# Patient Record
Sex: FEMALE | Race: BLACK OR AFRICAN AMERICAN | NOT HISPANIC OR LATINO | Employment: STUDENT | URBAN - METROPOLITAN AREA
[De-identification: names, ages, dates, MRNs, and addresses within clinical notes are randomized per-mention and may not be internally consistent; named-entity substitution may affect disease eponyms.]

---

## 2024-02-09 ENCOUNTER — APPOINTMENT (OUTPATIENT)
Dept: RADIOLOGY | Facility: HOSPITAL | Age: 54
End: 2024-02-09
Payer: COMMERCIAL

## 2024-02-09 ENCOUNTER — APPOINTMENT (OUTPATIENT)
Dept: CARDIOLOGY | Facility: HOSPITAL | Age: 54
End: 2024-02-09
Payer: COMMERCIAL

## 2024-02-09 ENCOUNTER — HOSPITAL ENCOUNTER (INPATIENT)
Facility: HOSPITAL | Age: 54
LOS: 4 days | Discharge: HOME | End: 2024-02-13
Attending: EMERGENCY MEDICINE | Admitting: INTERNAL MEDICINE
Payer: COMMERCIAL

## 2024-02-09 DIAGNOSIS — R07.9 CHEST PAIN, UNSPECIFIED TYPE: ICD-10-CM

## 2024-02-09 DIAGNOSIS — L03.115 CELLULITIS OF RIGHT LOWER LEG: Primary | ICD-10-CM

## 2024-02-09 DIAGNOSIS — I82.4Y1 ACUTE DEEP VEIN THROMBOSIS (DVT) OF PROXIMAL VEIN OF RIGHT LOWER EXTREMITY (MULTI): ICD-10-CM

## 2024-02-09 LAB
ALBUMIN SERPL-MCNC: 3.9 G/DL (ref 3.5–5)
ALP BLD-CCNC: 69 U/L (ref 35–125)
ALT SERPL-CCNC: 23 U/L (ref 5–40)
ANION GAP SERPL CALC-SCNC: 7 MMOL/L
APPEARANCE UR: CLEAR
AST SERPL-CCNC: 17 U/L (ref 5–40)
BASOPHILS # BLD AUTO: 0.01 X10*3/UL (ref 0–0.1)
BASOPHILS NFR BLD AUTO: 0.2 %
BILIRUB SERPL-MCNC: 0.3 MG/DL (ref 0.1–1.2)
BILIRUB UR STRIP.AUTO-MCNC: NEGATIVE MG/DL
BUN SERPL-MCNC: 11 MG/DL (ref 8–25)
CALCIUM SERPL-MCNC: 9.2 MG/DL (ref 8.5–10.4)
CHLORIDE SERPL-SCNC: 104 MMOL/L (ref 97–107)
CO2 SERPL-SCNC: 28 MMOL/L (ref 24–31)
COLOR UR: NORMAL
CREAT SERPL-MCNC: 1 MG/DL (ref 0.4–1.6)
CRP SERPL-MCNC: 1.2 MG/DL (ref 0–2)
EGFRCR SERPLBLD CKD-EPI 2021: 67 ML/MIN/1.73M*2
EOSINOPHIL # BLD AUTO: 0.11 X10*3/UL (ref 0–0.7)
EOSINOPHIL NFR BLD AUTO: 2.1 %
ERYTHROCYTE [DISTWIDTH] IN BLOOD BY AUTOMATED COUNT: 13.3 % (ref 11.5–14.5)
ERYTHROCYTE [SEDIMENTATION RATE] IN BLOOD BY WESTERGREN METHOD: 15 MM/H (ref 0–30)
GLUCOSE SERPL-MCNC: 108 MG/DL (ref 65–99)
GLUCOSE UR STRIP.AUTO-MCNC: NORMAL MG/DL
HCG UR QL IA.RAPID: NEGATIVE
HCT VFR BLD AUTO: 41.2 % (ref 36–46)
HGB BLD-MCNC: 13.3 G/DL (ref 12–16)
IMM GRANULOCYTES # BLD AUTO: 0.01 X10*3/UL (ref 0–0.7)
IMM GRANULOCYTES NFR BLD AUTO: 0.2 % (ref 0–0.9)
KETONES UR STRIP.AUTO-MCNC: NEGATIVE MG/DL
LACTATE BLDV-SCNC: 0.6 MMOL/L (ref 0.4–2)
LEUKOCYTE ESTERASE UR QL STRIP.AUTO: NEGATIVE
LYMPHOCYTES # BLD AUTO: 1.71 X10*3/UL (ref 1.2–4.8)
LYMPHOCYTES NFR BLD AUTO: 33.3 %
MCH RBC QN AUTO: 28.5 PG (ref 26–34)
MCHC RBC AUTO-ENTMCNC: 32.3 G/DL (ref 32–36)
MCV RBC AUTO: 88 FL (ref 80–100)
MONOCYTES # BLD AUTO: 0.4 X10*3/UL (ref 0.1–1)
MONOCYTES NFR BLD AUTO: 7.8 %
NEUTROPHILS # BLD AUTO: 2.89 X10*3/UL (ref 1.2–7.7)
NEUTROPHILS NFR BLD AUTO: 56.4 %
NITRITE UR QL STRIP.AUTO: NEGATIVE
NRBC BLD-RTO: 0 /100 WBCS (ref 0–0)
PH UR STRIP.AUTO: 5.5 [PH]
PLATELET # BLD AUTO: 205 X10*3/UL (ref 150–450)
POTASSIUM SERPL-SCNC: 3.9 MMOL/L (ref 3.4–5.1)
PROT SERPL-MCNC: 7.1 G/DL (ref 5.9–7.9)
PROT UR STRIP.AUTO-MCNC: NEGATIVE MG/DL
RBC # BLD AUTO: 4.67 X10*6/UL (ref 4–5.2)
RBC # UR STRIP.AUTO: NEGATIVE /UL
SARS-COV-2 RNA RESP QL NAA+PROBE: NOT DETECTED
SODIUM SERPL-SCNC: 139 MMOL/L (ref 133–145)
SP GR UR STRIP.AUTO: 1.01
TROPONIN T SERPL-MCNC: <6 NG/L
UROBILINOGEN UR STRIP.AUTO-MCNC: NORMAL MG/DL
WBC # BLD AUTO: 5.1 X10*3/UL (ref 4.4–11.3)

## 2024-02-09 PROCEDURE — 96374 THER/PROPH/DIAG INJ IV PUSH: CPT

## 2024-02-09 PROCEDURE — 93971 EXTREMITY STUDY: CPT | Mod: FOREIGN READ | Performed by: RADIOLOGY

## 2024-02-09 PROCEDURE — 93005 ELECTROCARDIOGRAM TRACING: CPT

## 2024-02-09 PROCEDURE — 1210000001 HC SEMI-PRIVATE ROOM DAILY

## 2024-02-09 PROCEDURE — 81025 URINE PREGNANCY TEST: CPT | Performed by: EMERGENCY MEDICINE

## 2024-02-09 PROCEDURE — 85652 RBC SED RATE AUTOMATED: CPT | Performed by: EMERGENCY MEDICINE

## 2024-02-09 PROCEDURE — 81003 URINALYSIS AUTO W/O SCOPE: CPT | Performed by: EMERGENCY MEDICINE

## 2024-02-09 PROCEDURE — 71045 X-RAY EXAM CHEST 1 VIEW: CPT

## 2024-02-09 PROCEDURE — 96375 TX/PRO/DX INJ NEW DRUG ADDON: CPT

## 2024-02-09 PROCEDURE — 87635 SARS-COV-2 COVID-19 AMP PRB: CPT | Performed by: EMERGENCY MEDICINE

## 2024-02-09 PROCEDURE — 2500000001 HC RX 250 WO HCPCS SELF ADMINISTERED DRUGS (ALT 637 FOR MEDICARE OP): Performed by: EMERGENCY MEDICINE

## 2024-02-09 PROCEDURE — 83605 ASSAY OF LACTIC ACID: CPT | Performed by: EMERGENCY MEDICINE

## 2024-02-09 PROCEDURE — 36415 COLL VENOUS BLD VENIPUNCTURE: CPT | Performed by: EMERGENCY MEDICINE

## 2024-02-09 PROCEDURE — 80053 COMPREHEN METABOLIC PANEL: CPT | Performed by: EMERGENCY MEDICINE

## 2024-02-09 PROCEDURE — 2500000004 HC RX 250 GENERAL PHARMACY W/ HCPCS (ALT 636 FOR OP/ED): Performed by: EMERGENCY MEDICINE

## 2024-02-09 PROCEDURE — 84484 ASSAY OF TROPONIN QUANT: CPT | Performed by: EMERGENCY MEDICINE

## 2024-02-09 PROCEDURE — 99285 EMERGENCY DEPT VISIT HI MDM: CPT | Mod: 25 | Performed by: EMERGENCY MEDICINE

## 2024-02-09 PROCEDURE — 87040 BLOOD CULTURE FOR BACTERIA: CPT | Mod: WESLAB | Performed by: EMERGENCY MEDICINE

## 2024-02-09 PROCEDURE — 85025 COMPLETE CBC W/AUTO DIFF WBC: CPT | Performed by: EMERGENCY MEDICINE

## 2024-02-09 PROCEDURE — 73590 X-RAY EXAM OF LOWER LEG: CPT | Mod: RT

## 2024-02-09 PROCEDURE — 87086 URINE CULTURE/COLONY COUNT: CPT | Mod: WESLAB | Performed by: EMERGENCY MEDICINE

## 2024-02-09 PROCEDURE — 86140 C-REACTIVE PROTEIN: CPT | Performed by: EMERGENCY MEDICINE

## 2024-02-09 PROCEDURE — 93971 EXTREMITY STUDY: CPT

## 2024-02-09 RX ORDER — MORPHINE SULFATE 4 MG/ML
4 INJECTION, SOLUTION INTRAMUSCULAR; INTRAVENOUS ONCE
Status: DISCONTINUED | OUTPATIENT
Start: 2024-02-09 | End: 2024-02-13 | Stop reason: HOSPADM

## 2024-02-09 RX ORDER — VANCOMYCIN 1 G/200ML
1000 INJECTION, SOLUTION INTRAVENOUS ONCE
Status: COMPLETED | OUTPATIENT
Start: 2024-02-09 | End: 2024-02-09

## 2024-02-09 RX ORDER — ACETAMINOPHEN 325 MG/1
650 TABLET ORAL ONCE
Status: COMPLETED | OUTPATIENT
Start: 2024-02-09 | End: 2024-02-09

## 2024-02-09 RX ORDER — FAMOTIDINE 10 MG/ML
20 INJECTION INTRAVENOUS ONCE
Status: COMPLETED | OUTPATIENT
Start: 2024-02-09 | End: 2024-02-09

## 2024-02-09 RX ORDER — ONDANSETRON HYDROCHLORIDE 2 MG/ML
4 INJECTION, SOLUTION INTRAVENOUS ONCE
Status: DISCONTINUED | OUTPATIENT
Start: 2024-02-09 | End: 2024-02-13 | Stop reason: HOSPADM

## 2024-02-09 RX ADMIN — FAMOTIDINE 20 MG: 10 INJECTION INTRAVENOUS at 20:21

## 2024-02-09 RX ADMIN — ACETAMINOPHEN 650 MG: 325 TABLET ORAL at 20:20

## 2024-02-09 RX ADMIN — SODIUM CHLORIDE 1000 ML: 900 INJECTION, SOLUTION INTRAVENOUS at 20:22

## 2024-02-09 RX ADMIN — VANCOMYCIN 1000 MG: 1 INJECTION, SOLUTION INTRAVENOUS at 20:21

## 2024-02-09 RX ADMIN — APIXABAN 10 MG: 5 TABLET, FILM COATED ORAL at 20:20

## 2024-02-09 RX ADMIN — PIPERACILLIN SODIUM AND TAZOBACTAM SODIUM 3.38 G: 3; .375 INJECTION, SOLUTION INTRAVENOUS at 20:21

## 2024-02-09 ASSESSMENT — LIFESTYLE VARIABLES
EVER FELT BAD OR GUILTY ABOUT YOUR DRINKING: NO
EVER HAD A DRINK FIRST THING IN THE MORNING TO STEADY YOUR NERVES TO GET RID OF A HANGOVER: NO
HAVE PEOPLE ANNOYED YOU BY CRITICIZING YOUR DRINKING: NO
HAVE YOU EVER FELT YOU SHOULD CUT DOWN ON YOUR DRINKING: NO

## 2024-02-09 ASSESSMENT — COLUMBIA-SUICIDE SEVERITY RATING SCALE - C-SSRS
1. IN THE PAST MONTH, HAVE YOU WISHED YOU WERE DEAD OR WISHED YOU COULD GO TO SLEEP AND NOT WAKE UP?: NO
2. HAVE YOU ACTUALLY HAD ANY THOUGHTS OF KILLING YOURSELF?: NO
6. HAVE YOU EVER DONE ANYTHING, STARTED TO DO ANYTHING, OR PREPARED TO DO ANYTHING TO END YOUR LIFE?: NO

## 2024-02-09 ASSESSMENT — PAIN - FUNCTIONAL ASSESSMENT: PAIN_FUNCTIONAL_ASSESSMENT: 0-10

## 2024-02-09 ASSESSMENT — PAIN DESCRIPTION - ORIENTATION: ORIENTATION: RIGHT

## 2024-02-09 ASSESSMENT — PAIN DESCRIPTION - LOCATION: LOCATION: LEG

## 2024-02-09 ASSESSMENT — PAIN DESCRIPTION - DESCRIPTORS: DESCRIPTORS: PRESSURE;BURNING

## 2024-02-09 ASSESSMENT — PAIN SCALES - GENERAL: PAINLEVEL_OUTOF10: 8

## 2024-02-10 LAB
HOLD SPECIMEN: NORMAL
TROPONIN T SERPL-MCNC: <6 NG/L

## 2024-02-10 PROCEDURE — 2500000001 HC RX 250 WO HCPCS SELF ADMINISTERED DRUGS (ALT 637 FOR MEDICARE OP): Performed by: INTERNAL MEDICINE

## 2024-02-10 PROCEDURE — 1200000002 HC GENERAL ROOM WITH TELEMETRY DAILY

## 2024-02-10 PROCEDURE — 99223 1ST HOSP IP/OBS HIGH 75: CPT | Performed by: NURSE PRACTITIONER

## 2024-02-10 PROCEDURE — 2500000004 HC RX 250 GENERAL PHARMACY W/ HCPCS (ALT 636 FOR OP/ED): Performed by: INTERNAL MEDICINE

## 2024-02-10 PROCEDURE — 36415 COLL VENOUS BLD VENIPUNCTURE: CPT | Performed by: INTERNAL MEDICINE

## 2024-02-10 PROCEDURE — 84484 ASSAY OF TROPONIN QUANT: CPT | Performed by: INTERNAL MEDICINE

## 2024-02-10 PROCEDURE — 84484 ASSAY OF TROPONIN QUANT: CPT | Performed by: EMERGENCY MEDICINE

## 2024-02-10 PROCEDURE — 36415 COLL VENOUS BLD VENIPUNCTURE: CPT | Performed by: EMERGENCY MEDICINE

## 2024-02-10 PROCEDURE — 2500000002 HC RX 250 W HCPCS SELF ADMINISTERED DRUGS (ALT 637 FOR MEDICARE OP, ALT 636 FOR OP/ED): Performed by: HOSPITALIST

## 2024-02-10 RX ORDER — NICOTINE POLACRILEX 2 MG
3 GUM BUCCAL DAILY
COMMUNITY

## 2024-02-10 RX ORDER — FUROSEMIDE 20 MG/1
20 TABLET ORAL DAILY
Status: DISCONTINUED | OUTPATIENT
Start: 2024-02-10 | End: 2024-02-13 | Stop reason: HOSPADM

## 2024-02-10 RX ORDER — DUTASTERIDE 0.5 MG/1
0.5 CAPSULE, LIQUID FILLED ORAL DAILY
COMMUNITY
End: 2024-02-13 | Stop reason: HOSPADM

## 2024-02-10 RX ORDER — METOPROLOL TARTRATE 25 MG/1
12.5 TABLET, FILM COATED ORAL 2 TIMES DAILY
Status: DISCONTINUED | OUTPATIENT
Start: 2024-02-10 | End: 2024-02-12

## 2024-02-10 RX ORDER — ACETAMINOPHEN 160 MG/5ML
650 SOLUTION ORAL EVERY 4 HOURS PRN
Status: DISCONTINUED | OUTPATIENT
Start: 2024-02-10 | End: 2024-02-13 | Stop reason: HOSPADM

## 2024-02-10 RX ORDER — AMMONIUM LACTATE 12 G/100G
1 LOTION TOPICAL DAILY PRN
Status: DISCONTINUED | OUTPATIENT
Start: 2024-02-10 | End: 2024-02-13 | Stop reason: HOSPADM

## 2024-02-10 RX ORDER — BETAMETHASONE VALERATE 1.2 MG/G
1 OINTMENT TOPICAL 2 TIMES DAILY
COMMUNITY
End: 2024-02-13 | Stop reason: HOSPADM

## 2024-02-10 RX ORDER — CLOBETASOL PROPIONATE 0.5 MG/G
1 AEROSOL, FOAM TOPICAL DAILY
COMMUNITY
End: 2024-02-13 | Stop reason: HOSPADM

## 2024-02-10 RX ORDER — BISMUTH SUBSALICYLATE 262 MG
1 TABLET,CHEWABLE ORAL DAILY
Status: DISCONTINUED | OUTPATIENT
Start: 2024-02-10 | End: 2024-02-13 | Stop reason: HOSPADM

## 2024-02-10 RX ORDER — CHOLECALCIFEROL (VITAMIN D3) 50 MCG
2000 TABLET ORAL DAILY
Status: DISCONTINUED | OUTPATIENT
Start: 2024-02-10 | End: 2024-02-13 | Stop reason: HOSPADM

## 2024-02-10 RX ORDER — ACETAMINOPHEN 500 MG
1000 TABLET ORAL EVERY 6 HOURS PRN
COMMUNITY

## 2024-02-10 RX ORDER — ONDANSETRON 4 MG/1
4 TABLET, ORALLY DISINTEGRATING ORAL EVERY 6 HOURS PRN
COMMUNITY
End: 2024-02-13 | Stop reason: HOSPADM

## 2024-02-10 RX ORDER — PANTOPRAZOLE SODIUM 40 MG/1
40 TABLET, DELAYED RELEASE ORAL DAILY
Status: DISCONTINUED | OUTPATIENT
Start: 2024-02-10 | End: 2024-02-13 | Stop reason: HOSPADM

## 2024-02-10 RX ORDER — TRAMADOL HYDROCHLORIDE 50 MG/1
50 TABLET ORAL EVERY 8 HOURS PRN
Status: DISCONTINUED | OUTPATIENT
Start: 2024-02-10 | End: 2024-02-13 | Stop reason: HOSPADM

## 2024-02-10 RX ORDER — BISMUTH SUBSALICYLATE 262 MG
1 TABLET,CHEWABLE ORAL DAILY
COMMUNITY
End: 2024-02-13 | Stop reason: HOSPADM

## 2024-02-10 RX ORDER — FUROSEMIDE 20 MG/1
20 TABLET ORAL DAILY
COMMUNITY

## 2024-02-10 RX ORDER — VANCOMYCIN 750 MG/150ML
750 INJECTION, SOLUTION INTRAVENOUS EVERY 12 HOURS
Status: DISCONTINUED | OUTPATIENT
Start: 2024-02-10 | End: 2024-02-11

## 2024-02-10 RX ORDER — FOLIC ACID 1 MG/1
1 TABLET ORAL DAILY
COMMUNITY
End: 2024-02-13 | Stop reason: HOSPADM

## 2024-02-10 RX ORDER — FLUOCINONIDE 0.5 MG/G
1 CREAM TOPICAL 2 TIMES DAILY
COMMUNITY
End: 2024-02-13 | Stop reason: HOSPADM

## 2024-02-10 RX ORDER — POLYETHYLENE GLYCOL 3350 17 G/17G
17 POWDER, FOR SOLUTION ORAL DAILY PRN
Status: DISCONTINUED | OUTPATIENT
Start: 2024-02-10 | End: 2024-02-13 | Stop reason: HOSPADM

## 2024-02-10 RX ORDER — FLUOCINONIDE 0.5 MG/G
OINTMENT TOPICAL 2 TIMES DAILY
Status: DISCONTINUED | OUTPATIENT
Start: 2024-02-10 | End: 2024-02-12

## 2024-02-10 RX ORDER — ACETAMINOPHEN 325 MG/1
650 TABLET ORAL EVERY 4 HOURS PRN
Status: DISCONTINUED | OUTPATIENT
Start: 2024-02-10 | End: 2024-02-13 | Stop reason: HOSPADM

## 2024-02-10 RX ORDER — DICLOFENAC SODIUM 10 MG/G
4 GEL TOPICAL 2 TIMES DAILY
COMMUNITY

## 2024-02-10 RX ORDER — DICLOFENAC SODIUM 10 MG/G
4 GEL TOPICAL 2 TIMES DAILY
Status: DISCONTINUED | OUTPATIENT
Start: 2024-02-10 | End: 2024-02-13 | Stop reason: HOSPADM

## 2024-02-10 RX ORDER — NALTREXONE HYDROCHLORIDE 50 MG/1
50 TABLET, FILM COATED ORAL DAILY
COMMUNITY
End: 2024-02-10 | Stop reason: ENTERED-IN-ERROR

## 2024-02-10 RX ORDER — CLOBETASOL PROPIONATE 0.5 MG/G
CREAM TOPICAL DAILY
Status: DISCONTINUED | OUTPATIENT
Start: 2024-02-10 | End: 2024-02-12

## 2024-02-10 RX ORDER — FLUOCINONIDE 0.5 MG/G
1 CREAM TOPICAL 2 TIMES DAILY
Status: DISCONTINUED | OUTPATIENT
Start: 2024-02-10 | End: 2024-02-12

## 2024-02-10 RX ORDER — AMMONIUM LACTATE 12 G/100G
1 LOTION TOPICAL AS NEEDED
COMMUNITY
End: 2024-02-13 | Stop reason: HOSPADM

## 2024-02-10 RX ORDER — ACETAMINOPHEN 500 MG
5 TABLET ORAL NIGHTLY PRN
Status: DISCONTINUED | OUTPATIENT
Start: 2024-02-10 | End: 2024-02-13 | Stop reason: HOSPADM

## 2024-02-10 RX ORDER — METOPROLOL TARTRATE 25 MG/1
12.5 TABLET, FILM COATED ORAL EVERY 12 HOURS
COMMUNITY
End: 2024-02-13 | Stop reason: HOSPADM

## 2024-02-10 RX ORDER — ACETAMINOPHEN 650 MG/1
650 SUPPOSITORY RECTAL EVERY 4 HOURS PRN
Status: DISCONTINUED | OUTPATIENT
Start: 2024-02-10 | End: 2024-02-13 | Stop reason: HOSPADM

## 2024-02-10 RX ORDER — FOLIC ACID 1 MG/1
1 TABLET ORAL DAILY
Status: DISCONTINUED | OUTPATIENT
Start: 2024-02-10 | End: 2024-02-13 | Stop reason: HOSPADM

## 2024-02-10 RX ORDER — CHOLECALCIFEROL (VITAMIN D3) 50 MCG
50 TABLET ORAL DAILY
COMMUNITY

## 2024-02-10 RX ADMIN — VANCOMYCIN 750 MG: 750 INJECTION, SOLUTION INTRAVENOUS at 08:51

## 2024-02-10 RX ADMIN — PIPERACILLIN SODIUM AND TAZOBACTAM SODIUM 3.38 G: 3; .375 INJECTION, SOLUTION INTRAVENOUS at 04:44

## 2024-02-10 RX ADMIN — VANCOMYCIN 750 MG: 750 INJECTION, SOLUTION INTRAVENOUS at 21:15

## 2024-02-10 RX ADMIN — PANTOPRAZOLE SODIUM 40 MG: 40 TABLET, DELAYED RELEASE ORAL at 14:47

## 2024-02-10 RX ADMIN — Medication 2000 UNITS: at 08:51

## 2024-02-10 RX ADMIN — PIPERACILLIN SODIUM AND TAZOBACTAM SODIUM 3.38 G: 3; .375 INJECTION, SOLUTION INTRAVENOUS at 18:00

## 2024-02-10 RX ADMIN — FOLIC ACID 1 MG: 1 TABLET ORAL at 08:50

## 2024-02-10 RX ADMIN — ACETAMINOPHEN 650 MG: 325 TABLET ORAL at 01:34

## 2024-02-10 RX ADMIN — TRAMADOL HYDROCHLORIDE 50 MG: 50 TABLET ORAL at 21:16

## 2024-02-10 RX ADMIN — APIXABAN 10 MG: 5 TABLET, FILM COATED ORAL at 08:50

## 2024-02-10 RX ADMIN — MULTIVITAMIN TABLET 1 TABLET: TABLET at 08:51

## 2024-02-10 RX ADMIN — FUROSEMIDE 20 MG: 20 TABLET ORAL at 08:51

## 2024-02-10 RX ADMIN — METOPROLOL TARTRATE 12.5 MG: 25 TABLET, FILM COATED ORAL at 21:16

## 2024-02-10 RX ADMIN — APIXABAN 10 MG: 5 TABLET, FILM COATED ORAL at 21:16

## 2024-02-10 RX ADMIN — PIPERACILLIN SODIUM AND TAZOBACTAM SODIUM 3.38 G: 3; .375 INJECTION, SOLUTION INTRAVENOUS at 11:42

## 2024-02-10 RX ADMIN — METOPROLOL TARTRATE 12.5 MG: 25 TABLET, FILM COATED ORAL at 08:50

## 2024-02-10 SDOH — HEALTH STABILITY: PHYSICAL HEALTH: ON AVERAGE, HOW MANY DAYS PER WEEK DO YOU ENGAGE IN MODERATE TO STRENUOUS EXERCISE (LIKE A BRISK WALK)?: 0 DAYS

## 2024-02-10 SDOH — SOCIAL STABILITY: SOCIAL NETWORK: HOW OFTEN DO YOU GET TOGETHER WITH FRIENDS OR RELATIVES?: MORE THAN THREE TIMES A WEEK

## 2024-02-10 SDOH — SOCIAL STABILITY: SOCIAL NETWORK
IN A TYPICAL WEEK, HOW MANY TIMES DO YOU TALK ON THE PHONE WITH FAMILY, FRIENDS, OR NEIGHBORS?: MORE THAN THREE TIMES A WEEK

## 2024-02-10 SDOH — SOCIAL STABILITY: SOCIAL INSECURITY: HAS ANYONE EVER THREATENED TO HURT YOUR FAMILY OR YOUR PETS?: NO

## 2024-02-10 SDOH — SOCIAL STABILITY: SOCIAL NETWORK: ARE YOU MARRIED, WIDOWED, DIVORCED, SEPARATED, NEVER MARRIED, OR LIVING WITH A PARTNER?: DIVORCED

## 2024-02-10 SDOH — HEALTH STABILITY: MENTAL HEALTH
STRESS IS WHEN SOMEONE FEELS TENSE, NERVOUS, ANXIOUS, OR CAN'T SLEEP AT NIGHT BECAUSE THEIR MIND IS TROUBLED. HOW STRESSED ARE YOU?: NOT AT ALL

## 2024-02-10 SDOH — HEALTH STABILITY: MENTAL HEALTH: HOW MANY STANDARD DRINKS CONTAINING ALCOHOL DO YOU HAVE ON A TYPICAL DAY?: 1 OR 2

## 2024-02-10 SDOH — ECONOMIC STABILITY: INCOME INSECURITY: IN THE LAST 12 MONTHS, WAS THERE A TIME WHEN YOU WERE NOT ABLE TO PAY THE MORTGAGE OR RENT ON TIME?: NO

## 2024-02-10 SDOH — SOCIAL STABILITY: SOCIAL INSECURITY: DO YOU FEEL ANYONE HAS EXPLOITED OR TAKEN ADVANTAGE OF YOU FINANCIALLY OR OF YOUR PERSONAL PROPERTY?: NO

## 2024-02-10 SDOH — SOCIAL STABILITY: SOCIAL INSECURITY: WERE YOU ABLE TO COMPLETE ALL THE BEHAVIORAL HEALTH SCREENINGS?: YES

## 2024-02-10 SDOH — SOCIAL STABILITY: SOCIAL INSECURITY: WITHIN THE LAST YEAR, HAVE YOU BEEN HUMILIATED OR EMOTIONALLY ABUSED IN OTHER WAYS BY YOUR PARTNER OR EX-PARTNER?: NO

## 2024-02-10 SDOH — ECONOMIC STABILITY: INCOME INSECURITY: IN THE PAST 12 MONTHS, HAS THE ELECTRIC, GAS, OIL, OR WATER COMPANY THREATENED TO SHUT OFF SERVICE IN YOUR HOME?: NO

## 2024-02-10 SDOH — ECONOMIC STABILITY: HOUSING INSECURITY: IN THE LAST 12 MONTHS, HOW MANY PLACES HAVE YOU LIVED?: 1

## 2024-02-10 SDOH — ECONOMIC STABILITY: FOOD INSECURITY: WITHIN THE PAST 12 MONTHS, THE FOOD YOU BOUGHT JUST DIDN'T LAST AND YOU DIDN'T HAVE MONEY TO GET MORE.: NEVER TRUE

## 2024-02-10 SDOH — ECONOMIC STABILITY: FOOD INSECURITY: WITHIN THE PAST 12 MONTHS, YOU WORRIED THAT YOUR FOOD WOULD RUN OUT BEFORE YOU GOT MONEY TO BUY MORE.: NEVER TRUE

## 2024-02-10 SDOH — ECONOMIC STABILITY: HOUSING INSECURITY
IN THE LAST 12 MONTHS, WAS THERE A TIME WHEN YOU DID NOT HAVE A STEADY PLACE TO SLEEP OR SLEPT IN A SHELTER (INCLUDING NOW)?: NO

## 2024-02-10 SDOH — HEALTH STABILITY: MENTAL HEALTH: HOW OFTEN DO YOU HAVE A DRINK CONTAINING ALCOHOL?: MONTHLY OR LESS

## 2024-02-10 SDOH — SOCIAL STABILITY: SOCIAL INSECURITY: ABUSE: ADULT

## 2024-02-10 SDOH — SOCIAL STABILITY: SOCIAL INSECURITY: WITHIN THE LAST YEAR, HAVE YOU BEEN AFRAID OF YOUR PARTNER OR EX-PARTNER?: NO

## 2024-02-10 SDOH — SOCIAL STABILITY: SOCIAL INSECURITY: ARE THERE ANY APPARENT SIGNS OF INJURIES/BEHAVIORS THAT COULD BE RELATED TO ABUSE/NEGLECT?: NO

## 2024-02-10 SDOH — ECONOMIC STABILITY: INCOME INSECURITY: HOW HARD IS IT FOR YOU TO PAY FOR THE VERY BASICS LIKE FOOD, HOUSING, MEDICAL CARE, AND HEATING?: NOT VERY HARD

## 2024-02-10 SDOH — SOCIAL STABILITY: SOCIAL INSECURITY: HAVE YOU HAD THOUGHTS OF HARMING ANYONE ELSE?: YES

## 2024-02-10 SDOH — SOCIAL STABILITY: SOCIAL INSECURITY: DO YOU FEEL UNSAFE GOING BACK TO THE PLACE WHERE YOU ARE LIVING?: NO

## 2024-02-10 SDOH — SOCIAL STABILITY: SOCIAL NETWORK: HOW OFTEN DO YOU ATTEND CHURCH OR RELIGIOUS SERVICES?: MORE THAN 4 TIMES PER YEAR

## 2024-02-10 SDOH — HEALTH STABILITY: MENTAL HEALTH: HOW OFTEN DO YOU HAVE 6 OR MORE DRINKS ON ONE OCCASION?: NEVER

## 2024-02-10 SDOH — SOCIAL STABILITY: SOCIAL NETWORK: HOW OFTEN DO YOU ATTENT MEETINGS OF THE CLUB OR ORGANIZATION YOU BELONG TO?: MORE THAN 4 TIMES PER YEAR

## 2024-02-10 SDOH — HEALTH STABILITY: PHYSICAL HEALTH: ON AVERAGE, HOW MANY MINUTES DO YOU ENGAGE IN EXERCISE AT THIS LEVEL?: 0 MIN

## 2024-02-10 SDOH — SOCIAL STABILITY: SOCIAL NETWORK
DO YOU BELONG TO ANY CLUBS OR ORGANIZATIONS SUCH AS CHURCH GROUPS UNIONS, FRATERNAL OR ATHLETIC GROUPS, OR SCHOOL GROUPS?: YES

## 2024-02-10 SDOH — ECONOMIC STABILITY: TRANSPORTATION INSECURITY
IN THE PAST 12 MONTHS, HAS THE LACK OF TRANSPORTATION KEPT YOU FROM MEDICAL APPOINTMENTS OR FROM GETTING MEDICATIONS?: NO

## 2024-02-10 SDOH — SOCIAL STABILITY: SOCIAL INSECURITY: DOES ANYONE TRY TO KEEP YOU FROM HAVING/CONTACTING OTHER FRIENDS OR DOING THINGS OUTSIDE YOUR HOME?: NO

## 2024-02-10 SDOH — SOCIAL STABILITY: SOCIAL INSECURITY
WITHIN THE LAST YEAR, HAVE YOU BEEN KICKED, HIT, SLAPPED, OR OTHERWISE PHYSICALLY HURT BY YOUR PARTNER OR EX-PARTNER?: NO

## 2024-02-10 SDOH — SOCIAL STABILITY: SOCIAL INSECURITY
WITHIN THE LAST YEAR, HAVE TO BEEN RAPED OR FORCED TO HAVE ANY KIND OF SEXUAL ACTIVITY BY YOUR PARTNER OR EX-PARTNER?: NO

## 2024-02-10 SDOH — ECONOMIC STABILITY: TRANSPORTATION INSECURITY
IN THE PAST 12 MONTHS, HAS LACK OF TRANSPORTATION KEPT YOU FROM MEETINGS, WORK, OR FROM GETTING THINGS NEEDED FOR DAILY LIVING?: NO

## 2024-02-10 ASSESSMENT — COGNITIVE AND FUNCTIONAL STATUS - GENERAL
PATIENT BASELINE BEDBOUND: NO
MOBILITY SCORE: 24
DAILY ACTIVITIY SCORE: 24

## 2024-02-10 ASSESSMENT — ENCOUNTER SYMPTOMS: WOUND: 1

## 2024-02-10 ASSESSMENT — ACTIVITIES OF DAILY LIVING (ADL)
WALKS IN HOME: INDEPENDENT
GROOMING: INDEPENDENT
HEARING - LEFT EAR: FUNCTIONAL
ADEQUATE_TO_COMPLETE_ADL: YES
TOILETING: INDEPENDENT
FEEDING YOURSELF: INDEPENDENT
HEARING - RIGHT EAR: FUNCTIONAL
BATHING: INDEPENDENT
PATIENT'S MEMORY ADEQUATE TO SAFELY COMPLETE DAILY ACTIVITIES?: YES
DRESSING YOURSELF: INDEPENDENT
JUDGMENT_ADEQUATE_SAFELY_COMPLETE_DAILY_ACTIVITIES: YES

## 2024-02-10 ASSESSMENT — LIFESTYLE VARIABLES
HOW MANY STANDARD DRINKS CONTAINING ALCOHOL DO YOU HAVE ON A TYPICAL DAY: 1 OR 2
AUDIT-C TOTAL SCORE: 1
AUDIT-C TOTAL SCORE: 2
SKIP TO QUESTIONS 9-10: 0
SKIP TO QUESTIONS 9-10: 1
PRESCIPTION_ABUSE_PAST_12_MONTHS: NO
HOW OFTEN DO YOU HAVE A DRINK CONTAINING ALCOHOL: MONTHLY OR LESS
AUDIT-C TOTAL SCORE: 2
HOW OFTEN DO YOU HAVE 6 OR MORE DRINKS ON ONE OCCASION: LESS THAN MONTHLY

## 2024-02-10 ASSESSMENT — PATIENT HEALTH QUESTIONNAIRE - PHQ9
1. LITTLE INTEREST OR PLEASURE IN DOING THINGS: NOT AT ALL
SUM OF ALL RESPONSES TO PHQ9 QUESTIONS 1 & 2: 0
2. FEELING DOWN, DEPRESSED OR HOPELESS: NOT AT ALL

## 2024-02-10 ASSESSMENT — PAIN DESCRIPTION - LOCATION: LOCATION: HEAD

## 2024-02-10 ASSESSMENT — PAIN SCALES - GENERAL
PAINLEVEL_OUTOF10: 4
PAINLEVEL_OUTOF10: 6

## 2024-02-10 NOTE — NURSING NOTE
VSS, pt states no pain at this time, ambulatory to bathroom, IV abx infusing, denies further needs at this time.

## 2024-02-10 NOTE — NURSING NOTE
Orders reviewed, pt has orders for compression stocks, pt has confirmed DVT to Right lower extremity, no compression devices applied at this time per nursing judgement call.

## 2024-02-10 NOTE — CONSULTS
Reason for Consult   RLE DVT, Lymphedema    History Of Present Illness    This is a 53-year-old female with past medical history of lymphedema, venous insufficiency status post vein stripping/ablation, morbid obesity and sickle cell disease who presented to the emergency department for further evaluation of right lower extremity edema and blisters.  Our service was consulted due history of right lower extremity DVT and now acute right popliteal vein DVT, as well as her lymphedema.  Patient also has cellulitis of the right calf.  She has a history of DVT in 2021 which developed a couple of weeks after she was diagnosed with COVID. She was originally placed on Rivaroxaban but developed shortness of breath soon after and was changed to Eliquis.  She had concerns with Eliquis due to hair loss.  She then developed osteonecrosis and her Eliquis was discontinued later in 2021.  There was then concern in December 2023 that patient had developed a new acute DVT of her right leg and she was restarted on Eliquis, however, she was seen by her vascular doctor at [facility] who noted that the thrombus was chronic and not acute and that she had not demonstrated any acute lower extremity symptoms that may have suggested a new DVT, and therefore discontinued her Eliquis.     Patient now with acute right popliteal vein DVT.  She is currently on Eliquis.  She also has diagnosis of lymphedema in bilateral lower extremities and states she has a lymphedema pump at home.  She is advised against using her lymphedema pump until her DVT resolves.  She states she wears juxta light compression stockings at home.  She has a right posterior calf ulceration which is tender with surrounding erythema present.  She has 2+ pitting edema of the extremity.  She has diffuse keloids noted to bilateral anterior lower legs.  Patient did not tolerate application of Xeroform to her right leg.  This was discussed with ED nursing, I ordered Adaptic or  Vaseline gauze, Kerlix and Ace wrap from base of toes to below the knee.       Past Medical History  Past Medical History:   Diagnosis Date    Acute deep vein thrombosis (DVT) of right iliofemoral vein (CMS/HCC)     BPPV (benign paroxysmal positional vertigo)     Cardiac murmur     Mitral valve prolapse     Sickle cell trait (CMS/HCC)     Venous insufficiency          Surgical History  Past Surgical History:   Procedure Laterality Date    OTHER SURGICAL HISTORY  07/28/2022    Tubal ligation    OTHER SURGICAL HISTORY  07/28/2022    Tubal ligation reversal          Social History  Social History     Socioeconomic History    Marital status:      Spouse name: Not on file    Number of children: Not on file    Years of education: Not on file    Highest education level: Not on file   Occupational History    Not on file   Tobacco Use    Smoking status: Never    Smokeless tobacco: Never   Substance and Sexual Activity    Alcohol use: Not Currently    Drug use: Never    Sexual activity: Not on file   Other Topics Concern    Not on file   Social History Narrative    Not on file     Social Determinants of Health     Financial Resource Strain: Low Risk  (2/10/2024)    Overall Financial Resource Strain (CARDIA)     Difficulty of Paying Living Expenses: Not very hard   Food Insecurity: No Food Insecurity (2/10/2024)    Hunger Vital Sign     Worried About Running Out of Food in the Last Year: Never true     Ran Out of Food in the Last Year: Never true   Transportation Needs: No Transportation Needs (2/10/2024)    PRAPARE - Transportation     Lack of Transportation (Medical): No     Lack of Transportation (Non-Medical): No   Physical Activity: Inactive (2/10/2024)    Exercise Vital Sign     Days of Exercise per Week: 0 days     Minutes of Exercise per Session: 0 min   Stress: No Stress Concern Present (2/10/2024)    [employer] [place] [place] [employer] - Occupational Stress Questionnaire     Feeling of Stress : Not at  all   Social Connections: Moderately Integrated (2/10/2024)    Social Connection and Isolation Panel [NHANES]     Frequency of Communication with Friends and Family: More than three times a week     Frequency of Social Gatherings with Friends and Family: More than three times a week     Attends Yazdanism Services: More than 4 times per year     Active Member of Clubs or Organizations: Yes     Attends Club or Organization Meetings: More than 4 times per year     Marital Status:    Intimate Partner Violence: Not At Risk (2/10/2024)    Humiliation, Afraid, Rape, and Kick questionnaire     Fear of Current or Ex-Partner: No     Emotionally Abused: No     Physically Abused: No     Sexually Abused: No   Housing Stability: Low Risk  (2/10/2024)    Housing Stability Vital Sign     Unable to Pay for Housing in the Last Year: No     Number of Places Lived in the Last Year: 1     Unstable Housing in the Last Year: No         Family History  Family History   Problem Relation Name Age of Onset    Cancer Mother      Heart disease Mother      Diabetes Father      Hypertension Father      Dementia Father            Allergies  Allergies   Allergen Reactions    Etanercept Anaphylaxis     After an enbrel injection, her throat started to feel funny and BP dropped.    Fondaparinux Other     Leg tingling, jittery, and nose blood    Latex Rash    Cephalexin Unknown    Gelatin GI intolerance and Other    Iodinated Contrast Media Other and Headache    Other Omega-3s Other     Pork, citrus, corn, latex - skin reaction    Chlorhexidine Itching and Rash    Enoxaparin Other, Rash and Unknown     Pork containing - has a pork allergy    Rivaroxaban GI intolerance and GI Upset         Relevant Results  Results for orders placed or performed during the hospital encounter of 02/09/24 (from the past 24 hour(s))   CBC and Auto Differential   Result Value Ref Range    WBC 5.1 4.4 - 11.3 x10*3/uL    nRBC 0.0 0.0 - 0.0 /100 WBCs    RBC 4.67 4.00 -  5.20 x10*6/uL    Hemoglobin 13.3 12.0 - 16.0 g/dL    Hematocrit 41.2 36.0 - 46.0 %    MCV 88 80 - 100 fL    MCH 28.5 26.0 - 34.0 pg    MCHC 32.3 32.0 - 36.0 g/dL    RDW 13.3 11.5 - 14.5 %    Platelets 205 150 - 450 x10*3/uL    Neutrophils % 56.4 40.0 - 80.0 %    Immature Granulocytes %, Automated 0.2 0.0 - 0.9 %    Lymphocytes % 33.3 13.0 - 44.0 %    Monocytes % 7.8 2.0 - 10.0 %    Eosinophils % 2.1 0.0 - 6.0 %    Basophils % 0.2 0.0 - 2.0 %    Neutrophils Absolute 2.89 1.20 - 7.70 x10*3/uL    Immature Granulocytes Absolute, Automated 0.01 0.00 - 0.70 x10*3/uL    Lymphocytes Absolute 1.71 1.20 - 4.80 x10*3/uL    Monocytes Absolute 0.40 0.10 - 1.00 x10*3/uL    Eosinophils Absolute 0.11 0.00 - 0.70 x10*3/uL    Basophils Absolute 0.01 0.00 - 0.10 x10*3/uL   Comprehensive metabolic panel   Result Value Ref Range    Glucose 108 (H) 65 - 99 mg/dL    Sodium 139 133 - 145 mmol/L    Potassium 3.9 3.4 - 5.1 mmol/L    Chloride 104 97 - 107 mmol/L    Bicarbonate 28 24 - 31 mmol/L    Urea Nitrogen 11 8 - 25 mg/dL    Creatinine 1.00 0.40 - 1.60 mg/dL    eGFR 67 >60 mL/min/1.73m*2    Calcium 9.2 8.5 - 10.4 mg/dL    Albumin 3.9 3.5 - 5.0 g/dL    Alkaline Phosphatase 69 35 - 125 U/L    Total Protein 7.1 5.9 - 7.9 g/dL    AST 17 5 - 40 U/L    Bilirubin, Total 0.3 0.1 - 1.2 mg/dL    ALT 23 5 - 40 U/L    Anion Gap 7 <=19 mmol/L   Sedimentation Rate   Result Value Ref Range    Sedimentation Rate 15 0 - 30 mm/h   C-Reactive Protein   Result Value Ref Range    C-Reactive Protein 1.20 0.00 - 2.00 mg/dL   BLOOD GAS LACTIC ACID, VENOUS   Result Value Ref Range    POCT Lactate, Venous 0.6 0.4 - 2.0 mmol/L   Blood Culture    Specimen: Peripheral Venipuncture; Blood culture   Result Value Ref Range    Blood Culture Loaded on Instrument - Culture in progress    Blood Culture    Specimen: Peripheral Venipuncture; Blood culture   Result Value Ref Range    Blood Culture Loaded on Instrument - Culture in progress    Serial Troponin, Initial ([facility])    Result Value Ref Range    Troponin T, High Sensitivity <6 <=14 ng/L   Sars-CoV-2 PCR   Result Value Ref Range    Coronavirus 2019, PCR Not Detected Not Detected   Urinalysis with Reflex Culture and Microscopic   Result Value Ref Range    Color, Urine Light-Yellow Light-Yellow, Yellow, Dark-Yellow    Appearance, Urine Clear Clear    Specific Gravity, Urine 1.012 1.005 - 1.035    pH, Urine 5.5 5.0, 5.5, 6.0, 6.5, 7.0, 7.5, 8.0    Protein, Urine NEGATIVE NEGATIVE, 10 (TRACE), 20 (TRACE) mg/dL    Glucose, Urine Normal Normal mg/dL    Blood, Urine NEGATIVE NEGATIVE    Ketones, Urine NEGATIVE NEGATIVE mg/dL    Bilirubin, Urine NEGATIVE NEGATIVE    Urobilinogen, Urine Normal Normal mg/dL    Nitrite, Urine NEGATIVE NEGATIVE    Leukocyte Esterase, Urine NEGATIVE NEGATIVE   Extra Urine Gray Tube   Result Value Ref Range    Extra Tube Hold for add-ons.    hCG, Urine, Qualitative   Result Value Ref Range    HCG, Urine NEGATIVE NEGATIVE   Serial Troponin, 2 Hour ([facility])   Result Value Ref Range    Troponin T, High Sensitivity <6 <=14 ng/L   Troponin T   Result Value Ref Range    Troponin T, High Sensitivity <6 <=14 ng/L   Serial Troponin, 6 Hour ([facility])   Result Value Ref Range    Troponin T, High Sensitivity <6 <=14 ng/L     Lower extremity venous duplex right    Result Date: 2/9/2024  STUDY: Right Lower Extremity Venous Doppler Ultrasound; 2/9/2024 9:19 PM INDICATION: Right leg pain, edema and warmth x three months.  History of DVT. COMPARISON: None available. ACCESSION NUMBER(S): [id number] ORDERING CLINICIAN: KATI ALBARADO TECHNIQUE:  Real-time grayscale, color, and spectral doppler ultrasound imaging of the right lower extremity veins was performed. FINDINGS: There is acute nonocclusive DVT demonstrated within the right popliteal vein. The right common femoral, profunda femoral and femoral veins demonstrated normal compressibility, normal phasic venous flow and normal response to augmentation.  There is no evidence for  echogenic thrombi.  The visualized deep calf veins are patent.  The contralateral common femoral vein is free of thrombosis.    Evidence for acute occlusive DVT within the right popliteal vein. The critical results of the study were discussed with, and acknowledged by Dr. Yrn Vo, by telephone on 02/09/2024 at 21:45. Signed by Richard Hudson II, MD    XR tibia fibula right 2 views    Result Date: 2/9/2024  Interpreted By:  Handy Oviedo, STUDY: XR TIBIA FIBULA RIGHT 2 VIEWS; 2/9/2024 7:21 pm   INDICATION: Signs/Symptoms:atraumatic pain;   COMPARISON: None available.   ACCESSION NUMBER(S): [id number]   ORDERING CLINICIAN: KATI ALBARADO   TECHNIQUE: Views: Right tibia and fibula, AP and Lateral   FINDINGS: RESULT: There is no evidence for fracture or dislocation. Joint spaces appear adequately maintained. No other bony or soft tissue abnormality is identified.       No evidence for acute osseous abnormality.   Signed by: Handy Oviedo 2/9/2024 7:42 PM Dictation workstation:   [id number]    XR chest 1 view    Result Date: 2/9/2024  Interpreted By:  Handy Oviedo, STUDY: XR CHEST 1 VIEW; 2/9/2024 7:21 pm   INDICATION: Signs/Symptoms:chest pain.   COMPARISON: 12/20/2021   ACCESSION NUMBER(S): [id number]   ORDERING CLINICIAN: KATI ALBARADO   TECHNIQUE: 1 view of the chest was performed.   FINDINGS: The lungs are adequately inflated. No acute consolidation. No pleural effusion. No pneumothorax.  The cardiomediastinal silhouette is within normal limits.       No acute cardiopulmonary disease.   Signed by: Handy Oviedo 2/9/2024 7:37 PM Dictation workstation:   [id number]        Physical exam  Constitutional:  Alert and oriented to person, place, date/time in no acute distress.  HEENT:  Atraumatic, normocephalic. PERRL. EOMI.  Nares patent.  Mucous membranes moist.    Neck:  Trachea midline.  Respiratory:  Clear to auscultation.  Cardiac:  Regular rate and rhythm.   Cardiovascular: +1 edema of the  left lower extremity.  +2 edema of the right lower extremity.  Pulse exam: Radial and femoral pulses palpable bilateral.  Pedal pulses palpable  Abdominal:  Soft, nontender, nondistended, bowel sounds present.  Obese  Musculoskeletal:  Moves extremities freely.  Dermatological: Clean and dry.  Diffuse keloids noted to bilateral anterior lower extremities.  Venous stasis ulceration noted to right calf with granular tissue, erythema of the calf is present, tenderness with palpation.  Neurological: Alert and oriented to person, place, date/time  Psych:  Calm, cooperative    Assessment and Plan    Right lower extremity popliteal vein DVT  History of right lower extremity DVT tolerating Eliquis in the past  Lymphedema    1.  Right lower extremity acute popliteal vein DVT: On Eliquis, would continue.  Due to history of DVT in 2021 and now patient being diagnosed with new DVT in her popliteal vein I would recommend to continue anticoagulation indefinitely.  Recommend thigh-high compression stocking to the right leg, knee-high compression stocking to the left leg.  She has a vascular doctor that she will follow-up as an outpatient.  Tells me she has juxta lite compression stockings at home.  Wound care orders placed.  Due to patient's erythema of the right calf I recommend patient's continue IV antibiotics overnight and be discharged home tomorrow, discussed with hospitalist.    2.  History of right lower extremity DVT: R iliofemoral DVT and GSV thrombophlebitis     3.  Lymphedema: Discussed in detail with patient on 2/10.  She is aware that lymphedema pump is contraindicated for DVT.  She can use her lymphedema pump to the left leg.  Advise 45 minutes twice daily.  Would not advise to use her lymphedema pump to the right leg until her DVT has resolved and documented, after repeat ultrasound in the future.  Patient does have juxta lite compression stockings at home.  I did advise that she continue right leg dressing changes  using Vaseline gauze or Adaptic, gauze, Kerlix and Ace wrap until the ulcer site heals.  Once the ulcer heals she can return to her compression stocking to the right leg.

## 2024-02-10 NOTE — ED PROVIDER NOTES
HPI   Chief Complaint   Patient presents with    Leg Swelling       HPI                    Dane Coma Scale Score: 15                     Patient History   Past Medical History:   Diagnosis Date    Personal history of other diseases of the circulatory system     History of mitral valve disease    Personal history of other diseases of the circulatory system     History of cardiac murmur     Past Surgical History:   Procedure Laterality Date    OTHER SURGICAL HISTORY  07/28/2022    Tubal ligation    OTHER SURGICAL HISTORY  07/28/2022    Tubal ligation reversal     No family history on file.  Social History     Tobacco Use    Smoking status: Not on file    Smokeless tobacco: Not on file   Substance Use Topics    Alcohol use: Not on file    Drug use: Not on file       Physical Exam   ED Triage Vitals [02/09/24 1851]   Temperature Heart Rate Respirations BP   36.6 °C (97.9 °F) 79 18 137/75      Pulse Ox Temp Source Heart Rate Source Patient Position   100 % Oral Monitor --      BP Location FiO2 (%)     -- --       Physical Exam    ED Course & [facility]   ED Course as of 02/09/24 2112 Fri Feb 09, 2024 1924 EKG: Normal sinus rhythm, left anterior fascicular block, nonspecific lateral ST abnormalities.  No STEMI.  Interpreted by me. [FR]      ED Course User Index  [FR] Yrn Vo DO         Diagnoses as of 02/09/24 2112   Cellulitis of right lower leg   Acute deep vein thrombosis (DVT) of proximal vein of right lower extremity (CMS/HCC)   Chest pain, unspecified type       Medical Decision Making    The patient is a 53-year-old female presenting to the emergency department for evaluation of an infection of her right lower leg.  She states that she was recently admitted to [facility] for her symptoms.  She states that she was on IV vancomycin while in the hospital and then was discharged and was taking oral amoxicillin.  She states that she was having worsening symptoms was not able to follow-up with her primary  care physician and/or wound care so she went to urgent care today and they told her they could not do anything for her and told her she would need to come to the emergency room to be admitted.  She states that the pain is a dull aching pain.  She states that she has had redness and swelling of her leg with a wound on the posterior calf that will not heal.  She states that it is sometimes weeping.  She denies any recent injury or trauma.  She denies any headache or visual changes.  She states that she was having a little bit of left-sided chest pressure while she was in the waiting room.  No better or worse.  No radiation.  She denies any significant shortness of breath.  No palpitations.  No diaphoresis.  No abdominal pain.  No nausea or vomiting.  No diarrhea or constipation but no urinary complaints.  All pertinent positives and negatives are recorded above.  All other systems reviewed and otherwise negative.  Vital signs within normal limits.  Physical exam with a well-nourished well-developed female in no acute distress.  HEENT exam within normal limits.  She has no evidence of airway compromise or respiratory distress.  Abdominal exam is benign.  She has no gross motor, neurologic or vascular deficits on exam.  She does have warmth, erythema and tenderness to palpation of her right lower extremity.  There is a superficial wound on the posterior right calf without drainage at this time..  No visible or palpable bony deformities.      EKG with normal sinus rhythm at 70 bpm, normal axis, normal voltage, normal ST segment, normal T waves      Oral acetaminophen, IV Pepcid, IV morphine, IV Zofran, IV fluids ordered.      Cultures were obtained and IV antibiotics were also ordered.      Diagnostic labs without significant abnormality.      The UA and lactic acid was pending at the time of admission      Troponin T of 12.  Repeat troponin T pending at the time of admission      XR chest 1 view   Final Result   No  acute cardiopulmonary disease.        Signed by: Handy Oviedo 2/9/2024 7:37 PM   Dictation workstation:   [id number]      XR tibia fibula right 2 views   Final Result   No evidence for acute osseous abnormality.        Signed by: Handy Oviedo 2/9/2024 7:42 PM   Dictation workstation:   [id number]      Lower extremity venous duplex right    (Results Pending)          The patient does not have any evidence of ischemia on EKG or cardiac enzymes.  No events on telemetry.  She does not have any evidence of airway compromise or respiratory distress on exam.  Chest x-ray without acute process.  No evidence of pneumonia or pneumothorax.  The right tib-fib does not show any evidence of osteomyelitis.  The right lower extremity duplex is pending a formal read but the preliminary reading from the radiologist indicates that she has an acute deep vein thrombosis.  The patient does report an allergy to heparin and Lovenox.  Oral Eliquis was started for treatment of her DVT.  IV antibiotics were initiated for treatment of her cellulitis.        The patient was admitted for further management of her lower extremity cellulitis and for trending of her cardiac enzymes.      Impression/diagnosis  Cellulitis of the right lower extremity  Left-sided chest pain  Right lower extremity DVT      I reviewed the results of the diagnostic labs and diagnostic imaging.  Formal radiology reading was completed by the radiologist      I independently reviewed the results of the EKG and diagnostic labs    Procedure  Procedures     Sparkle Elias MD  02/09/24 2109       Sparkle Elias MD  02/09/24 2112

## 2024-02-10 NOTE — H&P
History Of Present Illness  Amy John is a 53 y.o. female presenting with pain and blisters in the right leg and chest pain..  She has a history of deep vein thrombosis for which she had been treated with Eliquis..  She has had longstanding swelling in both legs.  She presented to the hospital emergency department last night because of pain and blisters in the lower right leg.  She has not been seen in a wound clinic and wants to establish care at the [facility] wound clinic after she is discharged from the hospital.  She receives her care primarily through the [facility].  While in the lobby of the emergency department, the patient began to experience chest pain.  She described it as left-sided, sharp, associated with mild shortness of breath, nonpleuritic, nonradiating with no associated nausea or vomiting.  It has subsided by the time of this encounter.  She was recently seen in an emergency department and treated with IV vancomycin and discharged with doxycycline.  She subsequently went to an urgent care center and then was referred to the hospital.  [facility] of the right lower extremity and was diagnosed with a new deep vein thrombosis and she was started on Eliquis again.  She was subsequently seen by vascular medicine physician Dr. Ferreira at the [facility] on 12/14/2023.  According to the office notes, there was suspicion that the [facility] may have been representative of her known chronic post thrombotic change.  Duplex done through the [facility] vascular lab the previous day had demonstrated no interval change from prior.  The patient states that it was concluded that she did not have a new DVT at the time and Eliquis was stopped.  She had a repeat ultrasound of the right lower extremity done at [facility] last night and is reported as showing  acute occlusive deep vein thrombosis in the right popliteal vein.  She was started on Eliquis in the  emergency department.  She is admitted for the right leg ulceration, right leg DVT and chest pain     Past Medical History  Past Medical History:   Diagnosis Date    Acute deep vein thrombosis (DVT) of right iliofemoral vein (CMS/HCC)     BPPV (benign paroxysmal positional vertigo)     Cardiac murmur     Mitral valve prolapse     Sickle cell trait (CMS/HCC)     Venous insufficiency        Surgical History  Past Surgical History:   Procedure Laterality Date    OTHER SURGICAL HISTORY  07/28/2022    Tubal ligation    OTHER SURGICAL HISTORY  07/28/2022    Tubal ligation reversal        Social History  She reports that she has never smoked. She has never used smokeless tobacco. She reports that she does not currently use alcohol. She reports that she does not use drugs.    Family History  Family History   Problem Relation Name Age of Onset    Cancer Mother      Heart disease Mother      Diabetes Father      Hypertension Father      Dementia Father          Allergies  Etanercept, Fondaparinux, Latex, Cephalexin, Gelatin, Iodinated contrast media, Other omega-3s, Chlorhexidine, Enoxaparin, and Rivaroxaban    Review of Systems   General: Negative for fever,  chills or fatigue.    HEENT: Negative for headache, blurring of vision or double vision.    Cardiovascular: As in the HPI   Respiratory: Negative for cough, shortness of breath or wheezing.    Gastrointestinal: Negative for nausea, vomiting, hematemesis, abdominal pain or diarrhea.   Genitourinary: Negative for dysuria, hematuria, frequency or nocturia.   Musculoskeletal: Negative for joint pain, joint swelling or deformity.   Skin: As in HPI   Hematologic: Negative for bleeding or bruising.   Neurologic: Negative for headache, loss of consciousness. syncope or seizures       Physical Exam   General.: Awake alert well-developed, responsive   HEENT: Normocephalic, not icteric, not pale, no facial asymmetry, no pharyngeal erythema.   Neck: Supple, no carotid bruit, no  "thyroid enlargement.   Cardiovascular: Regular heart rate and rhythm normal S1 and S2.   Respiratory: Equal breath sounds bilaterally clear to auscultation.   Abdomen: Soft, nontender to palpation, bowel sounds present and normoactive.   Extremities: There was erythema of the right lower leg with blisters on the right anterior leg and a superficial ulcer on the right posterior leg. There were a few small scattered areas of blistering of the left leg. There was mild swelling of both legs.    Neurologic: Alert and oriented to self, place and date, muscle strength 5/5 in all extremities.   Psychological: Appropriate affect and behavior.     Last Recorded Vitals  Blood pressure 120/81, pulse 75, temperature 36.6 °C (97.9 °F), temperature source Oral, resp. rate 16, height 1.626 m (5' 4\"), weight 113 kg (250 lb), SpO2 99 %.    Relevant Results  Results for orders placed or performed during the hospital encounter of 02/09/24 (from the past 24 hour(s))   CBC and Auto Differential   Result Value Ref Range    WBC 5.1 4.4 - 11.3 x10*3/uL    nRBC 0.0 0.0 - 0.0 /100 WBCs    RBC 4.67 4.00 - 5.20 x10*6/uL    Hemoglobin 13.3 12.0 - 16.0 g/dL    Hematocrit 41.2 36.0 - 46.0 %    MCV 88 80 - 100 fL    MCH 28.5 26.0 - 34.0 pg    MCHC 32.3 32.0 - 36.0 g/dL    RDW 13.3 11.5 - 14.5 %    Platelets 205 150 - 450 x10*3/uL    Neutrophils % 56.4 40.0 - 80.0 %    Immature Granulocytes %, Automated 0.2 0.0 - 0.9 %    Lymphocytes % 33.3 13.0 - 44.0 %    Monocytes % 7.8 2.0 - 10.0 %    Eosinophils % 2.1 0.0 - 6.0 %    Basophils % 0.2 0.0 - 2.0 %    Neutrophils Absolute 2.89 1.20 - 7.70 x10*3/uL    Immature Granulocytes Absolute, Automated 0.01 0.00 - 0.70 x10*3/uL    Lymphocytes Absolute 1.71 1.20 - 4.80 x10*3/uL    Monocytes Absolute 0.40 0.10 - 1.00 x10*3/uL    Eosinophils Absolute 0.11 0.00 - 0.70 x10*3/uL    Basophils Absolute 0.01 0.00 - 0.10 x10*3/uL   Comprehensive metabolic panel   Result Value Ref Range    Glucose 108 (H) 65 - 99 mg/dL "    Sodium 139 133 - 145 mmol/L    Potassium 3.9 3.4 - 5.1 mmol/L    Chloride 104 97 - 107 mmol/L    Bicarbonate 28 24 - 31 mmol/L    Urea Nitrogen 11 8 - 25 mg/dL    Creatinine 1.00 0.40 - 1.60 mg/dL    eGFR 67 >60 mL/min/1.73m*2    Calcium 9.2 8.5 - 10.4 mg/dL    Albumin 3.9 3.5 - 5.0 g/dL    Alkaline Phosphatase 69 35 - 125 U/L    Total Protein 7.1 5.9 - 7.9 g/dL    AST 17 5 - 40 U/L    Bilirubin, Total 0.3 0.1 - 1.2 mg/dL    ALT 23 5 - 40 U/L    Anion Gap 7 <=19 mmol/L   Sedimentation Rate   Result Value Ref Range    Sedimentation Rate 15 0 - 30 mm/h   C-Reactive Protein   Result Value Ref Range    C-Reactive Protein 1.20 0.00 - 2.00 mg/dL   BLOOD GAS LACTIC ACID, VENOUS   Result Value Ref Range    POCT Lactate, Venous 0.6 0.4 - 2.0 mmol/L   Serial Troponin, Initial ([facility])   Result Value Ref Range    Troponin T, High Sensitivity <6 <=14 ng/L   Sars-CoV-2 PCR   Result Value Ref Range    Coronavirus 2019, PCR Not Detected Not Detected   Urinalysis with Reflex Culture and Microscopic   Result Value Ref Range    Color, Urine Light-Yellow Light-Yellow, Yellow, Dark-Yellow    Appearance, Urine Clear Clear    Specific Gravity, Urine 1.012 1.005 - 1.035    pH, Urine 5.5 5.0, 5.5, 6.0, 6.5, 7.0, 7.5, 8.0    Protein, Urine NEGATIVE NEGATIVE, 10 (TRACE), 20 (TRACE) mg/dL    Glucose, Urine Normal Normal mg/dL    Blood, Urine NEGATIVE NEGATIVE    Ketones, Urine NEGATIVE NEGATIVE mg/dL    Bilirubin, Urine NEGATIVE NEGATIVE    Urobilinogen, Urine Normal Normal mg/dL    Nitrite, Urine NEGATIVE NEGATIVE    Leukocyte Esterase, Urine NEGATIVE NEGATIVE   hCG, Urine, Qualitative   Result Value Ref Range    HCG, Urine NEGATIVE NEGATIVE     Lower extremity venous duplex right    Result Date: 2/9/2024  STUDY: Right Lower Extremity Venous Doppler Ultrasound; 2/9/2024 9:19 PM INDICATION: Right leg pain, edema and warmth x three months.  History of DVT. COMPARISON: None available. ACCESSION NUMBER(S): [id number] ORDERING CLINICIAN: KATI  VERENA TECHNIQUE:  Real-time grayscale, color, and spectral doppler ultrasound imaging of the right lower extremity veins was performed. FINDINGS: There is acute nonocclusive DVT demonstrated within the right popliteal vein. The right common femoral, profunda femoral and femoral veins demonstrated normal compressibility, normal phasic venous flow and normal response to augmentation.  There is no evidence for echogenic thrombi.  The visualized deep calf veins are patent.  The contralateral common femoral vein is free of thrombosis.    Evidence for acute occlusive DVT within the right popliteal vein. The critical results of the study were discussed with, and acknowledged by Dr. Yrn Vo, by telephone on 02/09/2024 at 21:45. Signed by Richard Hudson II, MD    XR tibia fibula right 2 views    Result Date: 2/9/2024  Interpreted By:  Handy Oviedo, STUDY: XR TIBIA FIBULA RIGHT 2 VIEWS; 2/9/2024 7:21 pm   INDICATION: Signs/Symptoms:atraumatic pain;   COMPARISON: None available.   ACCESSION NUMBER(S): [id number]   ORDERING CLINICIAN: KATI ALBARADO   TECHNIQUE: Views: Right tibia and fibula, AP and Lateral   FINDINGS: RESULT: There is no evidence for fracture or dislocation. Joint spaces appear adequately maintained. No other bony or soft tissue abnormality is identified.       No evidence for acute osseous abnormality.   Signed by: Handy Oviedo 2/9/2024 7:42 PM Dictation workstation:   [id number]    XR chest 1 view    Result Date: 2/9/2024  Interpreted By:  Handy Oviedo, STUDY: XR CHEST 1 VIEW; 2/9/2024 7:21 pm   INDICATION: Signs/Symptoms:chest pain.   COMPARISON: 12/20/2021   ACCESSION NUMBER(S): [id number]   ORDERING CLINICIAN: KATI ALBARADO   TECHNIQUE: 1 view of the chest was performed.   FINDINGS: The lungs are adequately inflated. No acute consolidation. No pleural effusion. No pneumothorax.  The cardiomediastinal silhouette is within normal limits.       No acute cardiopulmonary disease.    Signed by: Handy Oviedo 2/9/2024 7:37 PM Dictation workstation:   [id number]        Assessment/Plan   Principal Problem:    Cellulitis of right lower leg    Chest pain  Her initial troponin level was normal at less than 6.  Will repeat troponin levels and rule out acute coronary syndrome.  A twelve-lead EKG showed no acute ischemic changes.  Consult cardiology    Cellulitis of the right leg  Continue IV antibiotic coverage with Zosyn and vancomycin.  She received antibiotics in the ED    Right leg wounds  Wound care nurse consulted.  She may be referred to the wound clinic at discharge    Deep vein thrombosis of the right popliteal vein  She had the vein thrombosis of the right popliteal vein, reported to be acute.  She has asked if the findings are similar to those on previous ultrasounds done at the [facility].  We will need to compare with previous recent imaging done at outside facilities if possible.  Continue Eliquis for now    Sharron Lakhani MD

## 2024-02-10 NOTE — NURSING NOTE
Patient resting in bed, complains of pain in the legs due to wounds, denies SOB, denies chest pain, VSS

## 2024-02-10 NOTE — PROGRESS NOTES
"Vancomycin Dosing by Pharmacy- INITIAL    Amy John is a 53 y.o. year old female who Pharmacy has been consulted for vancomycin dosing for cellulitis, skin and soft tissue. Based on the patient's indication and renal status this patient will be dosed based on a goal AUC of 400-600.   Day 1  Consult by Dr. Sharron Lakhani, thank you for the consult.     Renal function is currently stable.    Visit Vitals  /81   Pulse 75   Temp 36.6 °C (97.9 °F) (Oral)   Resp 16        Lab Results   Component Value Date    CREATININE 1.00 02/09/2024    CREATININE 1.01 03/26/2022    CREATININE 0.95 12/20/2021    CREATININE 1.03 08/06/2020        Patient weight is No results found for: \"PTWEIGHT\"    No results found for: \"CULTURE\"     No intake/output data recorded.  [unfilled]    No results found for: \"PATIENTTEMP\"       Assessment/Plan     Patient has already been given a loading dose of 1000 mg in the ED at 2021 2/09/24  Will initiate vancomycin maintenance,  750 mg every 12 hours at 0900 2/10/24    This dosing regimen is predicted by [employer] to result in the following pharmacokinetic parameters:  Loading dose: N/A  Regimen: 750 mg IV every 12 hours.  Start time: 08:21 on 02/10/2024  Exposure target: AUC24 (range)400-600 mg/L.hr   AUC24,ss: 498 mg/L.hr  Probability of AUC24 > 400: 67 %  Ctrough,ss: 15.7 mg/L  Probability of Ctrough,ss > 20: 35 %  Probability of nephrotoxicity (Lodise VANIA 2009): 11 %      Follow-up level will be ordered on 2/11/24 at 0500 with first AM labs unless clinically indicated sooner.  Will continue to monitor renal function daily while on vancomycin and order serum creatinine at least every 48 hours if not already ordered.  Follow for continued vancomycin needs, clinical response, and signs/symptoms of toxicity.       Pradip Gibson, PharmD       "

## 2024-02-10 NOTE — CONSULTS
Inpatient consult to Infectious Diseases  Consult performed by: mOega Lakhani MD  Consult ordered by: Sharron Lakhani MD        Primary MD: No primary care provider on file.    Reason For Consult   right leg cellulitis    History Of Present Illness  Amy John is a 53 y.o. female presenting with right leg pain, swelling and ulcers.  She has a background history of  right leg lymphedema, morbid obesity, right lower extremity DVT, on anticoagulation.  I have been  and asked to see patient for infected right leg ulcers.  She reports history of right leg cellulitis.  She denies any fever or chills.  She denies any chest pain.  She is on IV vancomycin and Zosyn.  She had interval evaluation by vascular surgery team.       Past Medical History  She has a past medical history of Acute deep vein thrombosis (DVT) of right iliofemoral vein (CMS/HCC), BPPV (benign paroxysmal positional vertigo), Cardiac murmur, Mitral valve prolapse, Sickle cell trait (CMS/HCC), and Venous insufficiency.    Surgical History  She has a past surgical history that includes Other surgical history (07/28/2022) and Other surgical history (07/28/2022).     Social History     Occupational History    Not on file   Tobacco Use    Smoking status: Never    Smokeless tobacco: Never   Substance and Sexual Activity    Alcohol use: Not Currently    Drug use: Never    Sexual activity: Not on file     Travel History   Travel since 01/10/24    No documented travel since 01/10/24           Family History  Family History   Problem Relation Name Age of Onset    Cancer Mother      Heart disease Mother      Diabetes Father      Hypertension Father      Dementia Father       Allergies  Etanercept, Fondaparinux, Latex, Cephalexin, Gelatin, Iodinated contrast media, Other omega-3s, Chlorhexidine, Enoxaparin, and Rivaroxaban       There is no immunization history on file for this patient.  Medications  Home medications:  (Not in a hospital  "admission)    Current medications:  Scheduled medications  apixaban, 10 mg, oral, BID   Followed by  [START ON 2/16/2024] apixaban, 5 mg, oral, BID  cholecalciferol, 2,000 Units, oral, Daily  [Held by provider] clobetasol, , Topical, Daily  diclofenac sodium, 4 g, Topical, BID  [Held by provider] fluocinonide, 1 Application, Topical, BID  [Held by provider] fluocinonide, , Topical, BID  folic acid, 1 mg, oral, Daily  furosemide, 20 mg, oral, Daily  metoprolol tartrate, 12.5 mg, oral, BID  morphine, 4 mg, intravenous, Once  multivitamin, 1 tablet, oral, Daily  ondansetron, 4 mg, intravenous, Once  pantoprazole, 40 mg, oral, Daily  piperacillin-tazobactam, 3.375 g, intravenous, q6h  vancomycin, 750 mg, intravenous, q12h      Continuous medications     PRN medications  PRN medications: acetaminophen **OR** acetaminophen **OR** acetaminophen, ammonium lactate, melatonin, polyethylene glycol, traMADol    Review of Systems   Skin:  Positive for wound.   All other systems reviewed and are negative.       Objective  Range of Vitals (last 24 hours)  Heart Rate:  [74-98]   Temperature:  [36.6 °C (97.9 °F)-37 °C (98.6 °F)]   Respirations:  [16-22]   BP: (100-137)/(56-87)   Height:  [162.6 cm (5' 4\")]   Weight:  [113 kg (250 lb)]   Pulse Ox:  [96 %-100 %]   Daily Weight  02/09/24 : 113 kg (250 lb)    Body mass index is 42.91 kg/m².     Physical Exam  Constitutional:       Appearance: Normal appearance.   HENT:      Head: Normocephalic and atraumatic.      Nose: Nose normal.   Eyes:      Extraocular Movements: Extraocular movements intact.      Conjunctiva/sclera: Conjunctivae normal.   Cardiovascular:      Rate and Rhythm: Regular rhythm.      Heart sounds: Normal heart sounds.   Pulmonary:      Breath sounds: Normal breath sounds.   Abdominal:      General: Bowel sounds are normal.      Palpations: Abdomen is soft.   Musculoskeletal:      Cervical back: Neck supple.      Right lower leg: Edema present.   Skin:     Comments: " "Right lower extremity erythema, anterior and posterior leg ulcers   Neurological:      Mental Status: She is alert and oriented to person, place, and time.   Psychiatric:         Mood and Affect: Mood normal.         Behavior: Behavior normal.          Relevant Results  Outside Hospital Results    Labs  Results from last 72 hours   Lab Units 02/09/24 2005   WBC AUTO x10*3/uL 5.1   HEMOGLOBIN g/dL 13.3   HEMATOCRIT % 41.2   PLATELETS AUTO x10*3/uL 205   NEUTROS PCT AUTO % 56.4   LYMPHS PCT AUTO % 33.3   MONOS PCT AUTO % 7.8   EOS PCT AUTO % 2.1     Results from last 72 hours   Lab Units 02/09/24 2005   SODIUM mmol/L 139   POTASSIUM mmol/L 3.9   CHLORIDE mmol/L 104   CO2 mmol/L 28   BUN mg/dL 11   CREATININE mg/dL 1.00   GLUCOSE mg/dL 108*   CALCIUM mg/dL 9.2   ANION GAP mmol/L 7   EGFR mL/min/1.73m*2 67     Results from last 72 hours   Lab Units 02/09/24 2005   ALK PHOS U/L 69   BILIRUBIN TOTAL mg/dL 0.3   PROTEIN TOTAL g/dL 7.1   ALT U/L 23   AST U/L 17   ALBUMIN g/dL 3.9     Estimated Creatinine Clearance: 78.3 mL/min (by C-G formula based on SCr of 1 mg/dL).  C-Reactive Protein   Date Value Ref Range Status   02/09/2024 1.20 0.00 - 2.00 mg/dL Final     Sedimentation Rate   Date Value Ref Range Status   02/09/2024 15 0 - 30 mm/h Final     No results found for: \"HIV1X2\", \"HIVCONF\", \"PASECK3PZ\"  No results found for: \"HEPCABINIT\", \"HEPCAB\", \"HCVPCRQUANT\"  Microbiology   reviewed  Imaging  Lower extremity venous duplex right    Result Date: 2/9/2024  STUDY: Right Lower Extremity Venous Doppler Ultrasound; 2/9/2024 9:19 PM INDICATION: Right leg pain, edema and warmth x three months.  History of DVT. COMPARISON: None available. ACCESSION NUMBER(S): [id number] ORDERING CLINICIAN: KATI ALBARADO TECHNIQUE:  Real-time grayscale, color, and spectral doppler ultrasound imaging of the right lower extremity veins was performed. FINDINGS: There is acute nonocclusive DVT demonstrated within the right popliteal vein. The right " common femoral, profunda femoral and femoral veins demonstrated normal compressibility, normal phasic venous flow and normal response to augmentation.  There is no evidence for echogenic thrombi.  The visualized deep calf veins are patent.  The contralateral common femoral vein is free of thrombosis.    Evidence for acute occlusive DVT within the right popliteal vein. The critical results of the study were discussed with, and acknowledged by Dr. Yrn Vo, by telephone on 02/09/2024 at 21:45. Signed by Richard Hudson II, MD    XR tibia fibula right 2 views    Result Date: 2/9/2024  Interpreted By:  Handy Oviedo, STUDY: XR TIBIA FIBULA RIGHT 2 VIEWS; 2/9/2024 7:21 pm   INDICATION: Signs/Symptoms:atraumatic pain;   COMPARISON: None available.   ACCESSION NUMBER(S): [id number]   ORDERING CLINICIAN: KATI ALBARADO   TECHNIQUE: Views: Right tibia and fibula, AP and Lateral   FINDINGS: RESULT: There is no evidence for fracture or dislocation. Joint spaces appear adequately maintained. No other bony or soft tissue abnormality is identified.       No evidence for acute osseous abnormality.   Signed by: Handy Oviedo 2/9/2024 7:42 PM Dictation workstation:   [id number]    XR chest 1 view    Result Date: 2/9/2024  Interpreted By:  Handy Oviedo, STUDY: XR CHEST 1 VIEW; 2/9/2024 7:21 pm   INDICATION: Signs/Symptoms:chest pain.   COMPARISON: 12/20/2021   ACCESSION NUMBER(S): [id number]   ORDERING CLINICIAN: KATI LABARADO   TECHNIQUE: 1 view of the chest was performed.   FINDINGS: The lungs are adequately inflated. No acute consolidation. No pleural effusion. No pneumothorax.  The cardiomediastinal silhouette is within normal limits.       No acute cardiopulmonary disease.   Signed by: Handy Oviedo 2/9/2024 7:37 PM Dictation workstation:   [id number]    XR ribs left 2 views    Result Date: 1/28/2024  * * *Final Report* * * DATE OF EXAM: Jan 28 2024  1:00PM   EUX   [id number]  -  XR RIB/CHST 3V AP RIB/OBL/CHST  L  / ACCESSION #  [id number] PROCEDURE REASON: Trauma      * * * * Physician Interpretation * * * * RESULT: HISTORY:  FELL PAST MONDAY HITTING SIDE ON STOOL TECHNIQUE: XR RIB/CHST 3V AP RIB/OBL/CHST L    Laterality:  LEFT    Number of different views (projections): 1 CHEST 2 RIBS COMPARISON: Chest radiograph 06/12/2022 RESULT: No acute displaced rib fracture. The imaged lung fields are clear with no focal infiltrate.  No pleural effusion or pneumothorax.  Stable cardiomediastinal silhouette. ---------------------------------------------    IMPRESSION: No acute displaced rib fracture. Transcribed Using Voice Recognition Transcribe Date/Time: Jan 28 2024  1:24P Dictated by: MIGUELITO LYNCH MD This examination was interpreted and the report reviewed and electronically signed by: MIGUELITO LYNCH MD on Jan 28 2024  1:27PM  EST    CT abdomen pelvis wo IV contrast    Result Date: 1/28/2024  * * *Final Report* * * DATE OF EXAM: Jan 28 2024 12:58PM   EUC   [id number]  -  CT ABD/PEL WO IVCON  / ACCESSION #  [id number] PROCEDURE REASON: Abdominal trauma, blunt      * * * * Physician Interpretation * * * * RESULT: EXAMINATION:  CT ABDOMEN AND PELVIS WITHOUT IV CONTRAST CLINICAL HISTORY: Fall.  Left-sided abdominal pain. TECHNIQUE: Non-IV contrast imaging of the abdomen and pelvis was performed using standard technique, scanning from just above the dome of the diaphragm to the symphysis pubis.  Unenhanced imaging is limited for the evaluation of some intra-abdominal and pelvic pathology. MQ:  CTAPWO_3 Contrast: IV: None Oral:  None CT Radiation dose: Integrated Dose-length product (DLP) for this visit =   386 mGy*cm. CT Dose Reduction Employed: Automated exposure control(AEC) and iterative recon COMPARISON: CT abdomen pelvis 12/29/2022 RESULT: Abdomen / Pelvis: Liver: Hepatic steatosis. Biliary: The gallbladder is unremarkable. Spleen: No splenomegaly. Pancreas: Unremarkable. Adrenals: No mass. Kidneys: No hydronephrosis or  nephrolithiasis.  2.4 cm right lower pole cyst. GI Tract: No bowel dilation. Lymph Nodes: No lymphadenopathy. Mesentery/peritoneum: No ascites. Retroperitoneum: No mass. Vasculature: No abdominal aortic or iliac artery aneurysm. Pelvis: No mass or ascites. Bones/Soft Tissues: No acute osseous abnormality. Lower thorax: Stable 2 mm peripheral left lower lobe nodule (2:6).  (topogram) images: No additional findings.    IMPRESSION: No acute abnormality in the abdomen or pelvis. Hepatic steatosis. Transcribed Using Voice Recognition Transcribe Date/Time: Jan 28 2024  1:10P Dictated by: MIGUELITO LYNCH MD This examination was interpreted and the report reviewed and electronically signed by: MGIUELITO LYNCH MD on Jan 28 2024  1:21PM  EST     Assessment/Plan    right leg cellulitis  Right leg venous ulcers  Right lower extremity DVT     continue vancomycin  Continue Zosyn  Right leg wound culture  Anticoagulation  Local care  Vascular surgery follow-up      Omega Lakhani MD

## 2024-02-10 NOTE — PROGRESS NOTES
mAy John is a 53 y.o. female on day 1 of admission presenting with Cellulitis of right lower leg.    Patient also found to have DVT in R popliteal vein.  She lives at home with her two children in a multi-level house, basement and upper level bed and bath. She has a cane for PRN use. She is independent with ADLs, daily tasks and drives. States she does not have a specific PCP, she sees providers at [facility].   Per Dr Lakhani's note, patient would like to be set up with [facility] wound care clinic, note states that will be arranged at discharge.   ADOD 02/12/2024  Plan is to discharge home with no needs. Patient's car in parking lot.       Amy Lipscomb RN

## 2024-02-10 NOTE — PROGRESS NOTES
Amy John is a 53 y.o. female on day 1 of admission presenting with Cellulitis of right lower leg.      Subjective   Patient reports that she is having a lot of pain in her R leg where there are blisters.   We discussed her imaging results and how they are different from prior.  She reports still with some chest pain, back pain, abdominal pain. She thinks this is from IV antibiotics, says pain worse when they are infusing.    Worried about when she can get discharged as she has a daughter at home she needs to get back to.       Objective     Last Recorded Vitals  /75 (BP Location: Left arm, Patient Position: Lying)   Pulse 78   Temp 37 °C (98.6 °F) (Oral)   Resp 16   Wt 113 kg (250 lb)   SpO2 98%   Intake/Output last 3 Shifts:  No intake or output data in the 24 hours ending 02/10/24 1302    Admission Weight  Weight: 113 kg (250 lb) (02/09/24 1851)    Daily Weight  02/09/24 : 113 kg (250 lb)    Image Results  Lower extremity venous duplex right  Narrative: STUDY:  Right Lower Extremity Venous Doppler Ultrasound; 2/9/2024 9:19 PM  INDICATION:  Right leg pain, edema and warmth x three months.  History of DVT.  COMPARISON:  None available.  ACCESSION NUMBER(S):  [id number]  ORDERING CLINICIAN:  KATI ALBARADO  TECHNIQUE:    Real-time grayscale, color, and spectral doppler ultrasound imaging of  the right lower extremity veins was performed.  FINDINGS:   There is acute nonocclusive DVT demonstrated within the right  popliteal vein.  The right common femoral, profunda femoral and femoral veins  demonstrated normal compressibility, normal phasic venous flow and  normal response to augmentation.  There is no evidence for echogenic  thrombi.  The visualized deep calf veins are patent.    The contralateral common femoral vein is free of thrombosis.  Impression: Evidence for acute occlusive DVT within the right popliteal vein.   The critical results of the study were discussed with, and  acknowledged by   Yrn Vo, by telephone on 02/09/2024 at  21:45.  Signed by Richard Hudson II, MD  XR tibia fibula right 2 views  Narrative: Interpreted By:  Handy Oviedo,   STUDY:  XR TIBIA FIBULA RIGHT 2 VIEWS; 2/9/2024 7:21 pm      INDICATION:  Signs/Symptoms:atraumatic pain;      COMPARISON:  None available.      ACCESSION NUMBER(S):  [id number]      ORDERING CLINICIAN:  KATI ALBARADO      TECHNIQUE:  Views: Right tibia and fibula, AP and Lateral      FINDINGS:  RESULT: There is no evidence for fracture or dislocation. Joint  spaces appear adequately maintained. No other bony or soft tissue  abnormality is identified.      Impression: No evidence for acute osseous abnormality.      Signed by: Handy Oviedo 2/9/2024 7:42 PM  Dictation workstation:   [employer]  XR chest 1 view  Narrative: Interpreted By:  Hanyd Oviedo,   STUDY:  XR CHEST 1 VIEW; 2/9/2024 7:21 pm      INDICATION:  Signs/Symptoms:chest pain.      COMPARISON:  12/20/2021      ACCESSION NUMBER(S):  [id number]      ORDERING CLINICIAN:  KATI ALBARADO      TECHNIQUE:  1 view of the chest was performed.      FINDINGS:  The lungs are adequately inflated. No acute consolidation. No pleural  effusion. No pneumothorax.  The cardiomediastinal silhouette is  within normal limits.      Impression: No acute cardiopulmonary disease.      Signed by: Handy Oviedo 2/9/2024 7:37 PM  Dictation workstation:   [id number]      Physical Exam  General: alert, no diaphoresis   Lungs: CTA BL   Heart: RRR, no LE edema BL   GI: abdomen soft, nontender, nondistended, BS present   MSK: no joint effusion or deformity   Skin: R lower leg edematous, erytehmatous. Raised blisters on anterior leg and superficial ulcer on posterior leg.   Neuro: grossly normal cognition, motor strength, sensation    Relevant Results             Scheduled medications  apixaban, 10 mg, oral, BID   Followed by  [START ON 2/16/2024] apixaban, 5 mg, oral, BID  cholecalciferol, 2,000 Units, oral,  Daily  [Held by provider] clobetasol, , Topical, Daily  diclofenac sodium, 4 g, Topical, BID  [Held by provider] fluocinonide, 1 Application, Topical, BID  [Held by provider] fluocinonide, , Topical, BID  folic acid, 1 mg, oral, Daily  furosemide, 20 mg, oral, Daily  metoprolol tartrate, 12.5 mg, oral, BID  morphine, 4 mg, intravenous, Once  multivitamin, 1 tablet, oral, Daily  ondansetron, 4 mg, intravenous, Once  pantoprazole, 40 mg, oral, Daily  piperacillin-tazobactam, 3.375 g, intravenous, q6h  vancomycin, 750 mg, intravenous, q12h      Continuous medications     PRN medications  PRN medications: acetaminophen **OR** acetaminophen **OR** acetaminophen, ammonium lactate, melatonin, polyethylene glycol, traMADol      Assessment/Plan                  Principal Problem:    Cellulitis of right lower leg    Chest pain  - troponin x2 negative. Pain is atypica. Cardiology to see; discussed with Dr. Zambrano     Cellulitis of the right leg  - Continue IV antibiotic coverage with Zosyn and vancomycin.  She received antibiotics in the ED. ID consulted     Right leg wounds  - Wound care nurse consulted.  She may be referred to the wound clinic at discharge     Deep vein thrombosis of the right popliteal vein  -History of right iliac thrombosis and chronic thrombosis but currently  DVT is acute in appearance and located in the right popliteal vein which has never had a clot previously based on the records I can see.  This appears to be a new, recurrent DVT episode.  She was restarted on Eliquis already this morning.  I will ask vascular surgery to see her and evaluate her given the skin changes noted on her lower extremity.  Discussed extensively with Ibis mcgill CNP.  -Normally follows with Dr. Ferreira at [facility] vascular surgery.  He is  -Anticipate at this point she likely will end up on indefinite anticoagulants although her normal vascular team can make final determinations              Asha Peña DO

## 2024-02-11 LAB
BACTERIA UR CULT: NO GROWTH
HOLD SPECIMEN: NORMAL
VANCOMYCIN SERPL-MCNC: 10.1 UG/ML (ref 10–20)

## 2024-02-11 PROCEDURE — 1210000001 HC SEMI-PRIVATE ROOM DAILY

## 2024-02-11 PROCEDURE — 99221 1ST HOSP IP/OBS SF/LOW 40: CPT | Performed by: INTERNAL MEDICINE

## 2024-02-11 PROCEDURE — 80202 ASSAY OF VANCOMYCIN: CPT | Performed by: INTERNAL MEDICINE

## 2024-02-11 PROCEDURE — 2500000004 HC RX 250 GENERAL PHARMACY W/ HCPCS (ALT 636 FOR OP/ED): Performed by: INTERNAL MEDICINE

## 2024-02-11 PROCEDURE — 99233 SBSQ HOSP IP/OBS HIGH 50: CPT | Performed by: NURSE PRACTITIONER

## 2024-02-11 PROCEDURE — 36415 COLL VENOUS BLD VENIPUNCTURE: CPT | Performed by: INTERNAL MEDICINE

## 2024-02-11 PROCEDURE — 2500000001 HC RX 250 WO HCPCS SELF ADMINISTERED DRUGS (ALT 637 FOR MEDICARE OP): Performed by: INTERNAL MEDICINE

## 2024-02-11 PROCEDURE — 2500000002 HC RX 250 W HCPCS SELF ADMINISTERED DRUGS (ALT 637 FOR MEDICARE OP, ALT 636 FOR OP/ED): Performed by: HOSPITALIST

## 2024-02-11 RX ADMIN — Medication 5 MG: at 21:58

## 2024-02-11 RX ADMIN — MULTIVITAMIN TABLET 1 TABLET: TABLET at 09:33

## 2024-02-11 RX ADMIN — METOPROLOL TARTRATE 12.5 MG: 25 TABLET, FILM COATED ORAL at 09:33

## 2024-02-11 RX ADMIN — APIXABAN 10 MG: 5 TABLET, FILM COATED ORAL at 09:33

## 2024-02-11 RX ADMIN — METOPROLOL TARTRATE 12.5 MG: 25 TABLET, FILM COATED ORAL at 21:58

## 2024-02-11 RX ADMIN — PIPERACILLIN SODIUM AND TAZOBACTAM SODIUM 3.38 G: 3; .375 INJECTION, SOLUTION INTRAVENOUS at 05:36

## 2024-02-11 RX ADMIN — PIPERACILLIN SODIUM AND TAZOBACTAM SODIUM 3.38 G: 3; .375 INJECTION, SOLUTION INTRAVENOUS at 17:56

## 2024-02-11 RX ADMIN — PIPERACILLIN SODIUM AND TAZOBACTAM SODIUM 3.38 G: 3; .375 INJECTION, SOLUTION INTRAVENOUS at 12:45

## 2024-02-11 RX ADMIN — Medication 2000 UNITS: at 09:33

## 2024-02-11 RX ADMIN — FOLIC ACID 1 MG: 1 TABLET ORAL at 09:33

## 2024-02-11 RX ADMIN — DICLOFENAC 4 G: 10 GEL TOPICAL at 09:34

## 2024-02-11 RX ADMIN — PANTOPRAZOLE SODIUM 40 MG: 40 TABLET, DELAYED RELEASE ORAL at 09:33

## 2024-02-11 RX ADMIN — FUROSEMIDE 20 MG: 20 TABLET ORAL at 09:33

## 2024-02-11 ASSESSMENT — COGNITIVE AND FUNCTIONAL STATUS - GENERAL
MOBILITY SCORE: 24
MOBILITY SCORE: 24
DAILY ACTIVITIY SCORE: 24
DAILY ACTIVITIY SCORE: 24

## 2024-02-11 NOTE — NURSING NOTE
Dressing to rt leg removed washed with normal saline, coverted with petroleum gauze, wrapped with kerlex and ace pt tolerated well call light in reach  Zosyn infusion in progress

## 2024-02-11 NOTE — PROGRESS NOTES
"Amy John is a 53 y.o. female on day 2 of admission presenting with Cellulitis of right lower leg.    Subjective   Interval History:   Afebrile, no chills  Moderate right leg pain  No chest pain or shortness of breath  No nausea vomiting or abdominal pain  Reports episode of diarrhea overnight  Review of Systems   All other systems reviewed and are negative.      Objective   Range of Vitals (last 24 hours)  Heart Rate:  [69-80]   Temp:  [36.6 °C (97.9 °F)-37 °C (98.6 °F)]   Resp:  [16-20]   BP: (121-130)/(52-75)   Height:  [162.6 cm (5' 4\")]   Weight:  [113 kg (250 lb)]   SpO2:  [96 %-100 %]   Daily Weight  02/11/24 : 113 kg (250 lb)    Body mass index is 42.91 kg/m².    Physical Exam  Constitutional:       Appearance: Normal appearance.   HENT:      Head: Normocephalic and atraumatic.      Nose: Nose normal.   Eyes:      Extraocular Movements: Extraocular movements intact.      Conjunctiva/sclera: Conjunctivae normal.   Cardiovascular:      Rate and Rhythm: Regular rhythm.      Heart sounds: Normal heart sounds.   Pulmonary:      Breath sounds: Normal breath sounds.   Abdominal:      General: Bowel sounds are normal.      Palpations: Abdomen is soft.   Musculoskeletal:      Cervical back: Neck supple.      Right lower leg: Edema present.   Skin:     Comments: Right lower extremity erythema, anterior and posterior leg ulcers   Neurological:      Mental Status: She is alert and oriented to person, place, and time.   Psychiatric:         Mood and Affect: Mood normal.         Behavior: Behavior normal.        Antibiotics  famotidine PF (Pepcid) injection 20 mg  sodium chloride 0.9 % bolus 1,000 mL  acetaminophen (Tylenol) tablet 650 mg  morphine injection 4 mg  ondansetron (Zofran) injection 4 mg  piperacillin-tazobactam-dextrose (Zosyn) IV 3.375 g  vancomycin (Xellia) 1 g in 200 mL (Xellia) IVPB 1,000 mg  apixaban (Eliquis) tablet 10 mg  apixaban (Eliquis) tablet 5 mg        cholecalciferol (Vitamin D-3) " tablet  acetaminophen (Tylenol) tablet    folic acid (Folvite) tablet        dutasteride (Avodart) capsule      naltrexone (Depade) tablet    ondansetron (Zofran-ODT) disintegrating tablet  furosemide (Lasix) tablet  metoprolol tartrate (Lopressor) tablet  ammonium lactate (Lac-Hydrin) 12 % lotion 1 Application  fluocinonide (Lidex) 0.05 % ointment  cholecalciferol (Vitamin D-3) tablet 2,000 Units  clobetasol (Temovate) 0.05 % cream  diclofenac sodium (Voltaren) 1 % gel 4 g  fluocinonide (Lidex) 0.05 % cream 1 Application  folic acid (Folvite) tablet 1 mg  furosemide (Lasix) tablet 20 mg  metoprolol tartrate (Lopressor) tablet 12.5 mg  multivitamin 1 tablet  piperacillin-tazobactam-dextrose (Zosyn) IV 3.375 g  apixaban (Eliquis) tablet 10 mg  apixaban (Eliquis) tablet 5 mg  acetaminophen (Tylenol) tablet 650 mg  acetaminophen (Tylenol) oral liquid 650 mg  acetaminophen (Tylenol) suppository 650 mg  melatonin tablet 5 mg  polyethylene glycol (Glycolax, Miralax) packet 17 g  apixaban (Eliquis) tablet 10 mg  apixaban (Eliquis) tablet 5 mg  vancomycin-diluent combo no.1 (Xellia) IVPB 750 mg  traMADol (Ultram) tablet 50 mg  pantoprazole (ProtoNix) EC tablet 40 mg      Relevant Results  Labs  Results from last 72 hours   Lab Units 02/09/24 2005   WBC AUTO x10*3/uL 5.1   HEMOGLOBIN g/dL 13.3   HEMATOCRIT % 41.2   PLATELETS AUTO x10*3/uL 205   NEUTROS PCT AUTO % 56.4   LYMPHS PCT AUTO % 33.3   MONOS PCT AUTO % 7.8   EOS PCT AUTO % 2.1     Results from last 72 hours   Lab Units 02/09/24 2005   SODIUM mmol/L 139   POTASSIUM mmol/L 3.9   CHLORIDE mmol/L 104   CO2 mmol/L 28   BUN mg/dL 11   CREATININE mg/dL 1.00   GLUCOSE mg/dL 108*   CALCIUM mg/dL 9.2   ANION GAP mmol/L 7   EGFR mL/min/1.73m*2 67     Results from last 72 hours   Lab Units 02/09/24 2005   ALK PHOS U/L 69   BILIRUBIN TOTAL mg/dL 0.3   PROTEIN TOTAL g/dL 7.1   ALT U/L 23   AST U/L 17   ALBUMIN g/dL 3.9     Estimated Creatinine Clearance: 78.3 mL/min (by C-G  formula based on SCr of 1 mg/dL).  C-Reactive Protein   Date Value Ref Range Status   02/09/2024 1.20 0.00 - 2.00 mg/dL Final     Microbiology  Reviewed-negative urine culture, blood cultures pending  Imaging  Lower extremity venous duplex right    Result Date: 2/9/2024  STUDY: Right Lower Extremity Venous Doppler Ultrasound; 2/9/2024 9:19 PM INDICATION: Right leg pain, edema and warmth x three months.  History of DVT. COMPARISON: None available. ACCESSION NUMBER(S): [id number] ORDERING CLINICIAN: KATI ALBARADO TECHNIQUE:  Real-time grayscale, color, and spectral doppler ultrasound imaging of the right lower extremity veins was performed. FINDINGS: There is acute nonocclusive DVT demonstrated within the right popliteal vein. The right common femoral, profunda femoral and femoral veins demonstrated normal compressibility, normal phasic venous flow and normal response to augmentation.  There is no evidence for echogenic thrombi.  The visualized deep calf veins are patent.  The contralateral common femoral vein is free of thrombosis.    Evidence for acute occlusive DVT within the right popliteal vein. The critical results of the study were discussed with, and acknowledged by Dr. Yrn Vo, by telephone on 02/09/2024 at 21:45. Signed by Richard Hudson II, MD    XR tibia fibula right 2 views    Result Date: 2/9/2024  Interpreted By:  Handy Oviedo, STUDY: XR TIBIA FIBULA RIGHT 2 VIEWS; 2/9/2024 7:21 pm   INDICATION: Signs/Symptoms:atraumatic pain;   COMPARISON: None available.   ACCESSION NUMBER(S): [id number]   ORDERING CLINICIAN: KATI ALBARADO   TECHNIQUE: Views: Right tibia and fibula, AP and Lateral   FINDINGS: RESULT: There is no evidence for fracture or dislocation. Joint spaces appear adequately maintained. No other bony or soft tissue abnormality is identified.       No evidence for acute osseous abnormality.   Signed by: Handy Oviedo 2/9/2024 7:42 PM Dictation workstation:   [id number]    XR  chest 1 view    Result Date: 2/9/2024  Interpreted By:  Handy Oviedo, STUDY: XR CHEST 1 VIEW; 2/9/2024 7:21 pm   INDICATION: Signs/Symptoms:chest pain.   COMPARISON: 12/20/2021   ACCESSION NUMBER(S): [id number]   ORDERING CLINICIAN: KATI ALBARADO   TECHNIQUE: 1 view of the chest was performed.   FINDINGS: The lungs are adequately inflated. No acute consolidation. No pleural effusion. No pneumothorax.  The cardiomediastinal silhouette is within normal limits.       No acute cardiopulmonary disease.   Signed by: Handy Oviedo 2/9/2024 7:37 PM Dictation workstation:   [id number]    XR ribs left 2 views    Result Date: 1/28/2024  * * *Final Report* * * DATE OF EXAM: Jan 28 2024  1:00PM   EUX   5243  -  XR RIB/CHST 3V AP RIB/OBL/CHST L  / ACCESSION #  [id number] PROCEDURE REASON: Trauma      * * * * Physician Interpretation * * * * RESULT: HISTORY:  FELL PAST MONDAY HITTING SIDE ON STOOL TECHNIQUE: XR RIB/CHST 3V AP RIB/OBL/CHST L    Laterality:  LEFT    Number of different views (projections): 1 CHEST 2 RIBS COMPARISON: Chest radiograph 06/12/2022 RESULT: No acute displaced rib fracture. The imaged lung fields are clear with no focal infiltrate.  No pleural effusion or pneumothorax.  Stable cardiomediastinal silhouette. ---------------------------------------------    IMPRESSION: No acute displaced rib fracture. Transcribed Using Voice Recognition Transcribe Date/Time: Jan 28 2024  1:24P Dictated by: MIGUELITO LYNCH MD This examination was interpreted and the report reviewed and electronically signed by: MIGUELITO LYNCH MD on Jan 28 2024  1:27PM  EST    CT abdomen pelvis wo IV contrast    Result Date: 1/28/2024  * * *Final Report* * * DATE OF EXAM: Jan 28 2024 12:58PM   EUC   0531  -  CT ABD/PEL WO IVCON  / ACCESSION #  [id number] PROCEDURE REASON: Abdominal trauma, blunt      * * * * Physician Interpretation * * * * RESULT: EXAMINATION:  CT ABDOMEN AND PELVIS WITHOUT IV CONTRAST CLINICAL HISTORY: Fall.   Left-sided abdominal pain. TECHNIQUE: Non-IV contrast imaging of the abdomen and pelvis was performed using standard technique, scanning from just above the dome of the diaphragm to the symphysis pubis.  Unenhanced imaging is limited for the evaluation of some intra-abdominal and pelvic pathology. MQ:  CTAPWO_3 Contrast: IV: None Oral:  None CT Radiation dose: Integrated Dose-length product (DLP) for this visit =   386 mGy*cm. CT Dose Reduction Employed: Automated exposure control(AEC) and iterative recon COMPARISON: CT abdomen pelvis 12/29/2022 RESULT: Abdomen / Pelvis: Liver: Hepatic steatosis. Biliary: The gallbladder is unremarkable. Spleen: No splenomegaly. Pancreas: Unremarkable. Adrenals: No mass. Kidneys: No hydronephrosis or nephrolithiasis.  2.4 cm right lower pole cyst. GI Tract: No bowel dilation. Lymph Nodes: No lymphadenopathy. Mesentery/peritoneum: No ascites. Retroperitoneum: No mass. Vasculature: No abdominal aortic or iliac artery aneurysm. Pelvis: No mass or ascites. Bones/Soft Tissues: No acute osseous abnormality. Lower thorax: Stable 2 mm peripheral left lower lobe nodule (2:6).  (topogram) images: No additional findings.    IMPRESSION: No acute abnormality in the abdomen or pelvis. Hepatic steatosis. Transcribed Using Voice Recognition Transcribe Date/Time: Jan 28 2024  1:10P Dictated by: MIGUELITO LYNCH MD This examination was interpreted and the report reviewed and electronically signed by: MIGUELITO LYNCH MD on Jan 28 2024  1:21PM  EST     Assessment/Plan   right leg cellulitis  Right leg venous ulcers  Right lower extremity DVT     Discontinue vancomycin  Continue Zosyn  Right leg wound culture  Anticoagulation  Local care  Vascular surgery follow-up  Further work up if diarrhea persists       Omega Lakhani MD

## 2024-02-11 NOTE — DISCHARGE INSTRUCTIONS
Wash daily with soap and water, pat dry  No tub soaks  Apply Vaseline gauze to ulcer site only, gauze, Kerlix and Ace wrap from base of toes to above the knee   You may use your lymphedema pump to the left leg only.  It is contraindicated to use to your right leg due to having a DVT  You will also see me in the future for repeat ultrasound of the leg in 2 to 3 months, or you can follow-up with your vascular doctor at the [facility].

## 2024-02-11 NOTE — PROGRESS NOTES
Amy John is a 53 y.o. female on day 2 of admission presenting with Cellulitis of right lower leg.      Subjective   Reports that she still having a lot of pain in her right leg.  Says she thinks it is better than yesterday but still sore.       Objective     Last Recorded Vitals  /63 (BP Location: Left arm, Patient Position: Sitting)   Pulse 80   Temp 36.9 °C (98.4 °F) (Oral)   Resp 18   Wt 113 kg (250 lb)   SpO2 95%   Intake/Output last 3 Shifts:  No intake or output data in the 24 hours ending 02/11/24 1501    Admission Weight  Weight: 113 kg (250 lb) (02/09/24 1851)    Daily Weight  02/11/24 : 113 kg (250 lb)    Image Results  Lower extremity venous duplex right  Narrative: STUDY:  Right Lower Extremity Venous Doppler Ultrasound; 2/9/2024 9:19 PM  INDICATION:  Right leg pain, edema and warmth x three months.  History of DVT.  COMPARISON:  None available.  ACCESSION NUMBER(S):  [id number]  ORDERING CLINICIAN:  KATI ALBARADO  TECHNIQUE:    Real-time grayscale, color, and spectral doppler ultrasound imaging of  the right lower extremity veins was performed.  FINDINGS:   There is acute nonocclusive DVT demonstrated within the right  popliteal vein.  The right common femoral, profunda femoral and femoral veins  demonstrated normal compressibility, normal phasic venous flow and  normal response to augmentation.  There is no evidence for echogenic  thrombi.  The visualized deep calf veins are patent.    The contralateral common femoral vein is free of thrombosis.  Impression: Evidence for acute occlusive DVT within the right popliteal vein.   The critical results of the study were discussed with, and  acknowledged by Dr. Yrn Vo, by telephone on 02/09/2024 at  21:45.  Signed by Richard Hudson II, MD  XR tibia fibula right 2 views  Narrative: Interpreted By:  Handy Oviedo,   STUDY:  XR TIBIA FIBULA RIGHT 2 VIEWS; 2/9/2024 7:21 pm      INDICATION:  Signs/Symptoms:atraumatic pain;       COMPARISON:  None available.      ACCESSION NUMBER(S):  [id number]      ORDERING CLINICIAN:  KATI ALBARADO      TECHNIQUE:  Views: Right tibia and fibula, AP and Lateral      FINDINGS:  RESULT: There is no evidence for fracture or dislocation. Joint  spaces appear adequately maintained. No other bony or soft tissue  abnormality is identified.      Impression: No evidence for acute osseous abnormality.      Signed by: Handy Oviedo 2/9/2024 7:42 PM  Dictation workstation:   "[email or web address]"  XR chest 1 view  Narrative: Interpreted By:  Handy Oviedo,   STUDY:  XR CHEST 1 VIEW; 2/9/2024 7:21 pm      INDICATION:  Signs/Symptoms:chest pain.      COMPARISON:  12/20/2021      ACCESSION NUMBER(S):  [id number]      ORDERING CLINICIAN:  KATI ALBARADO      TECHNIQUE:  1 view of the chest was performed.      FINDINGS:  The lungs are adequately inflated. No acute consolidation. No pleural  effusion. No pneumothorax.  The cardiomediastinal silhouette is  within normal limits.      Impression: No acute cardiopulmonary disease.      Signed by: Handy Oviedo 2/9/2024 7:37 PM  Dictation workstation:   [id number]      Physical Exam  General: alert, no diaphoresis   Lungs: CTA BL   Heart: RRR, no LE edema BL   GI: abdomen soft, nontender, nondistended, BS present   MSK: no joint effusion or deformity   Skin: R lower leg more swollen than the left.  Right lower extremity is wrapped in Ace.  Left lower extremity with keloid scars noted on anterior aspect and some darkening of skin in the distal leg   Neuro: grossly normal cognition, motor strength, sensation    Relevant Results             Scheduled medications  apixaban, 10 mg, oral, BID   Followed by  [START ON 2/16/2024] apixaban, 5 mg, oral, BID  cholecalciferol, 2,000 Units, oral, Daily  [Held by provider] clobetasol, , Topical, Daily  diclofenac sodium, 4 g, Topical, BID  [Held by provider] fluocinonide, 1 Application, Topical, BID  [Held by provider] fluocinonide, ,  Topical, BID  folic acid, 1 mg, oral, Daily  furosemide, 20 mg, oral, Daily  metoprolol tartrate, 12.5 mg, oral, BID  morphine, 4 mg, intravenous, Once  multivitamin, 1 tablet, oral, Daily  ondansetron, 4 mg, intravenous, Once  pantoprazole, 40 mg, oral, Daily  piperacillin-tazobactam, 3.375 g, intravenous, q6h      Continuous medications     PRN medications  PRN medications: acetaminophen **OR** acetaminophen **OR** acetaminophen, ammonium lactate, melatonin, polyethylene glycol, traMADol      Assessment/Plan                  Principal Problem:    Cellulitis of right lower leg    Chest pain  - troponin x2 negative. Pain is atypica. Cardiology to see; discussed with Dr. Zambrano     Cellulitis of the right leg  - Continue IV antibiotic coverage with Zosy.  She received antibiotics in the ED. ID consulted--discussed with Dr. Lakhani.  On discharge she can go with Keflex to finish out 14 days of antibiotic coverage.  He also said Augmentin was fine but patient says she has had interactions that 1 in the past.     Right leg wounds  - Wound care nurse consulted.  She may be referred to the wound clinic at discharge  -She is going to follow-up with Ibis mcgill at time of discharge for further management.  See Ibis mims note     Deep vein thrombosis of the right popliteal vein  -History of right iliac thrombosis and chronic thrombosis but currently  DVT is acute in appearance and located in the right popliteal vein which has never had a clot previously based on the records I can see.  This appears to be a new, recurrent DVT episode.  She was restarted on Eliquis already this morning.  I will ask vascular surgery to see her and evaluate her given the skin changes noted on her lower extremity.  Discussed extensively with Ibis mcgill CNP.  -Normally follows with Dr. Ferreira at [facility] vascular surgery.    -Anticipate at this point she likely will end up on indefinite anticoagulants  -She will be following up with Ibis mcgill on  discharge       Anticipate discharge tomorrow on oral antibiotics and Eliquis if patient feeling well       Asha Peña, DO

## 2024-02-11 NOTE — CONSULTS
Vancomycin Dosing by Pharmacy- Cessation of Therapy    Consult to pharmacy for vancomycin dosing has been discontinued by the prescriber, pharmacy will sign off at this time.    Please call pharmacy if there are further questions or re-enter a consult if vancomycin is resumed.     Trini Concepcion, PharmD

## 2024-02-11 NOTE — HOSPITAL COURSE
This is a 53-year-old woman with history of DVT after COVID infection.  She was treated short-term and then had further imaging that showed chronic stable DVT.  She comes in now with right lower extremity edema and pain.  DVT ultrasound showed acute right popliteal DVT.  This is in a different location than her prior DVTs and her current scan was paired to her prior scans.  She was started on Eliquis.  She was started on antibiotics for cellulitis of her right lower extremity.  She was evaluated by infectious disease and Dr. Lakhani.  Dr. Lakhani recommending total 14 days of oral antibiotics with Keflex.  Ibis mcgill saw the patient from vascular surgery.  She recommends indefinite Eliquis at this point given recurrent DVT.  She will follow-up with Ibis mcgill for vascular needs and wound care.    He was also having atypical chest pain in the emergency room.  She was seen by Dr. Zambrano who recommends outpatient follow-up for possible stress test.

## 2024-02-11 NOTE — CARE PLAN
The patient's goals for the shift include get betteer    The clinical goals for the shift include safety

## 2024-02-11 NOTE — PROGRESS NOTES
"  Amy John is a 53 y.o. female on day 2 of admission presenting with Cellulitis of right lower leg.      Subjective  Patient in no acute distress on bedside exam  Discussed with nursing to change her dressing  She endorses leg pain secondary to DVT as well as ulcer  She is tolerating Vaseline gauze to ulcer site  She denies nausea, vomiting, fever or chills      Objective        Last Recorded Vitals      2/10/2024     1:45 AM 2/10/2024     6:45 AM 2/10/2024     8:30 AM 2/10/2024    11:52 AM 2/10/2024     7:52 PM 2/11/2024    12:00 AM 2/11/2024     8:25 AM   Vitals   Systolic  132 130 126 122 121 123   Diastolic  78 66 75 52 63 63   Heart Rate 75 98 75 78 80 69 80   Temp   36.6 °C (97.9 °F) 37 °C (98.6 °F)  36.8 °C (98.2 °F) 36.9 °C (98.4 °F)   Resp   20 16 16 18 18   Height (in)      1.626 m (5' 4\")    Weight (lb)      250    BMI      42.91 kg/m2    BSA (m2)      2.26 m2        Imaging  Lower extremity venous duplex right    Result Date: 2/9/2024  STUDY: Right Lower Extremity Venous Doppler Ultrasound; 2/9/2024 9:19 PM INDICATION: Right leg pain, edema and warmth x three months.  History of DVT. COMPARISON: None available. ACCESSION NUMBER(S): [id number] ORDERING CLINICIAN: KATI ALBARADO TECHNIQUE:  Real-time grayscale, color, and spectral doppler ultrasound imaging of the right lower extremity veins was performed. FINDINGS: There is acute nonocclusive DVT demonstrated within the right popliteal vein. The right common femoral, profunda femoral and femoral veins demonstrated normal compressibility, normal phasic venous flow and normal response to augmentation.  There is no evidence for echogenic thrombi.  The visualized deep calf veins are patent.  The contralateral common femoral vein is free of thrombosis.    Evidence for acute occlusive DVT within the right popliteal vein. The critical results of the study were discussed with, and acknowledged by Dr. Yrn Vo, by telephone on 02/09/2024 at 21:45. " Signed by Richard Hudson II, MD    XR tibia fibula right 2 views    Result Date: 2/9/2024  Interpreted By:  Handy Oviedo, STUDY: XR TIBIA FIBULA RIGHT 2 VIEWS; 2/9/2024 7:21 pm   INDICATION: Signs/Symptoms:atraumatic pain;   COMPARISON: None available.   ACCESSION NUMBER(S): [id number]   ORDERING CLINICIAN: KATI ALBARADO   TECHNIQUE: Views: Right tibia and fibula, AP and Lateral   FINDINGS: RESULT: There is no evidence for fracture or dislocation. Joint spaces appear adequately maintained. No other bony or soft tissue abnormality is identified.       No evidence for acute osseous abnormality.   Signed by: Handy Oviedo 2/9/2024 7:42 PM Dictation workstation:   [id number]    XR chest 1 view    Result Date: 2/9/2024  Interpreted By:  Handy Oviedo, STUDY: XR CHEST 1 VIEW; 2/9/2024 7:21 pm   INDICATION: Signs/Symptoms:chest pain.   COMPARISON: 12/20/2021   ACCESSION NUMBER(S): [id number]   ORDERING CLINICIAN: KATI ALBARADO   TECHNIQUE: 1 view of the chest was performed.   FINDINGS: The lungs are adequately inflated. No acute consolidation. No pleural effusion. No pneumothorax.  The cardiomediastinal silhouette is within normal limits.       No acute cardiopulmonary disease.   Signed by: Handy Oviedo 2/9/2024 7:37 PM Dictation workstation:   [id number]        Relevant Results  Results for orders placed or performed during the hospital encounter of 02/09/24 (from the past 24 hour(s))   Lavender Top   Result Value Ref Range    Extra Tube Hold for add-ons.    Vancomycin   Result Value Ref Range    Vancomycin 10.1 10.0 - 20.0 ug/mL       Physical exam  Constitutional:  Alert and oriented to person, place, date/time in no acute distress.  HEENT:  Atraumatic, normocephalic. PERRL. EOMI.  Nares patent.  Mucous membranes moist.    Neck:  Trachea midline.  Respiratory:  Clear to auscultation.  Cardiac:  Regular rate and rhythm.   Cardiovascular: +1 edema of the left lower extremity.  +2 edema of the right  lower extremity.  Pulse exam: Radial and femoral pulses palpable bilateral.  Pedal pulses palpable  Abdominal:  Soft, nontender, nondistended, bowel sounds present.  Obese  Musculoskeletal:  Moves extremities freely.  Dermatological: Clean and dry.  Diffuse keloids noted to bilateral anterior lower extremities.  Venous stasis ulceration noted to right calf with granular tissue, erythema of the calf is present, tenderness with palpation.  Neurological: Alert and oriented to person, place, date/time  Psych:  Calm, cooperative     Assessment and Plan     Right lower extremity popliteal vein DVT  History of right lower extremity DVT tolerating Eliquis in the past  Lymphedema     1.  Right lower extremity acute popliteal vein DVT: On Eliquis, would continue.  Due to history of DVT in 2021 and now patient being diagnosed with new DVT in her popliteal vein I would recommend to continue anticoagulation indefinitely.  Recommend thigh-high compression stocking to the right leg, knee-high compression stocking to the left leg.  She has a vascular doctor that she will follow-up as an outpatient.  Tells me she has juxta lite compression stockings at home.  Wound care orders placed.  At patient's request she will follow-up with my service, wound care orders and follow-up placed.  She can make an appointment to see myself in 1 to 2 weeks.  Prescription for juxta lite compression stockings dispensed.  Nursing to change dressing today.  I will have her undergo an ultrasound of the right leg in 2 to 3 months as well, she will continue Eliquis indefinitely.     2.  History of right lower extremity DVT: R iliofemoral DVT and GSV thrombophlebitis      3.  Lymphedema: Discussed in detail with patient on 2/10.  She is aware that lymphedema pump is contraindicated for DVT.  She can use her lymphedema pump to the left leg.  Advise 45 minutes twice daily.  Would not advise to use her lymphedema pump to the right leg until her DVT has resolved  and documented, after repeat ultrasound in the future.  Patient does have juxta lite compression stockings at home.  I did advise that she continue right leg dressing changes using Vaseline gauze or Adaptic, gauze, Kerlix and Ace wrap until the ulcer site heals.  Once the ulcer heals she can return to her compression stocking to the right leg.

## 2024-02-11 NOTE — CONSULTS
"  Inpatient consult to Cardiology  Consult performed by: Thony Zambrano MD  Consult ordered by: Sharron Lakhani MD        History Of Present Illness:    Amy John is a 53 y.o. female patient with a history of mitral prolapse, sickle cell trait.  History of pain and found to be DVT in right leg currently on anticoagulation.  Patient complain of pain in the leg and swelling of the leg.  Came to emergency room with symptoms.  No active chest pain tightness patient does have sharp shooting left-sided chest pain increased with activity although sometimes lasting for many hours.  Recently patient received IV vancomycin as well as oxacillin.  Now admitted with DVT with Eliquis and atypical chest pain so far negative for pulmonary embolism.  History of right leg ulceration with DVT.  Patient had 3 sets of troponins unremarkable.  Last Recorded Vitals:  Vitals:    02/10/24 1152 02/10/24 1952 02/11/24 0000 02/11/24 0825   BP: 126/75 122/52 121/63 123/63   BP Location: Left arm Left arm Left arm Left arm   Patient Position: Lying  Lying Sitting   Pulse: 78 80 69 80   Resp: 16 16 18 18   Temp: 37 °C (98.6 °F)  36.8 °C (98.2 °F) 36.9 °C (98.4 °F)   TempSrc: Oral  Oral Oral   SpO2: 98% 100% 99% 95%   Weight:   113 kg (250 lb)    Height:   1.626 m (5' 4\")        Past Medical History:  She has a past medical history of Acute deep vein thrombosis (DVT) of right iliofemoral vein (CMS/HCC), BPPV (benign paroxysmal positional vertigo), Cardiac murmur, Mitral valve prolapse, Sickle cell trait (CMS/HCC), and Venous insufficiency.    Past Surgical History:  She has a past surgical history that includes Other surgical history (07/28/2022) and Other surgical history (07/28/2022).      Social History:  She reports that she has never smoked. She has never used smokeless tobacco. She reports that she does not currently use alcohol. She reports that she does not use drugs.    Family History:  Family History   Problem Relation Name " Age of Onset    Cancer Mother      Heart disease Mother      Diabetes Father      Hypertension Father      Dementia Father          Allergies:  Etanercept, Fondaparinux, Latex, Cephalexin, Gelatin, Iodinated contrast media, Other omega-3s, Chlorhexidine, Enoxaparin, and Rivaroxaban    Inpatient Medications:  Scheduled medications   Medication Dose Route Frequency    apixaban  10 mg oral BID    Followed by    [START ON 2/16/2024] apixaban  5 mg oral BID    cholecalciferol  2,000 Units oral Daily    [Held by provider] clobetasol   Topical Daily    diclofenac sodium  4 g Topical BID    [Held by provider] fluocinonide  1 Application Topical BID    [Held by provider] fluocinonide   Topical BID    folic acid  1 mg oral Daily    furosemide  20 mg oral Daily    metoprolol tartrate  12.5 mg oral BID    morphine  4 mg intravenous Once    multivitamin  1 tablet oral Daily    ondansetron  4 mg intravenous Once    pantoprazole  40 mg oral Daily    piperacillin-tazobactam  3.375 g intravenous q6h     Outpatient Medications:  Current Outpatient Medications   Medication Instructions    acetaminophen (TYLENOL) 1,000 mg, oral, Every 6 hours PRN    ammonium lactate (Lac-Hydrin) 12 % lotion 1 Application, Topical, As needed    betamethasone valerate (Valisone) 0.1 % ointment 1 Application, Topical, 2 times daily, Apply to the lower legs    biotin 1 mg capsule 3 capsules, oral, Daily    Cetaphil cream 1 Application, Topical, Daily, Apply to affected skin    cholecalciferol (VITAMIN D3) 50 mcg, oral, Daily    clobetasol (Olux) 0.05 % topical foam 1 Application, Topical, Daily, Apply to the scalp    clobetasoL 0.05 % lotion in metered-dose pump 1 Application, topical (top), 3 times weekly, Apply to the scalp    diclofenac sodium (VOLTAREN) 4 g, Topical, 2 times daily, Apply to the lower back    dutasteride (AVODART) 0.5 mg, oral, Daily    fexofenadine HCl (ALLEGRA ORAL) 180 mg, oral, Daily    fluocinonide (Lidex) 0.05 % cream 1  Application, Topical, 2 times daily, To the scalp    folic acid (FOLVITE) 1 mg, oral, Daily    furosemide (LASIX) 20 mg, oral, Daily    metoprolol tartrate (LOPRESSOR) 12.5 mg, oral, Every 12 hours    multivitamin tablet 1 tablet, oral, Daily    ondansetron ODT (ZOFRAN-ODT) 4 mg, oral, Every 6 hours PRN       Physical Exam:  HEENT: Normocephalic/atraumatic pupils equal react light  Neck exam mild JVD, no bruit  Lung exam clear to auscultation, few crackles at the bases  Cardiac exam is regular rhythm S1-S2, soft slight murmur heard.  No S3 heard.  Abdomen soft nontender, nondistended  Extremities no clubbing, cyanosis but trace edema  Neuro exam grossly intact.  Last Labs:  CBC - 2/9/2024:  8:05 PM  5.1 13.3 205    41.2      CMP - 2/9/2024:  8:05 PM  9.2 7.1 17 --- 0.3   _ 3.9 23 69      PTT - No results in last year.  _   _ _     BNP   Date/Time Value Ref Range Status   03/26/2022 08:59 AM 11 0 - 99 pg/mL Final     Comment:     .  <100 pg/mL - Heart failure unlikely  100-299 pg/mL - Intermediate probability of acute heart  .               failure exacerbation. Correlate with clinical  .               context and patient history.    >=300 pg/mL - Heart Failure likely. Correlate with clinical  .               context and patient history.  BNP testing is performed using different testing   methodology at [facility] than at other   [facility]. Direct result comparisons should   only be made within the same method.     12/20/2021 07:09 PM 16 0 - 99 pg/mL Final     Comment:     .  <100 pg/mL - Heart failure unlikely  100-299 pg/mL - Intermediate probability of acute heart  .               failure exacerbation. Correlate with clinical  .               context and patient history.    >=300 pg/mL - Heart Failure likely. Correlate with clinical  .               context and patient history.  BNP testing is performed using different testing   methodology at [facility] than at other   system  [facility]. Direct result comparisons should   only be made within the same method.       VLDL   Date/Time Value Ref Range Status   03/26/2022 08:59 AM 19 0 - 40 mg/dL Final   08/06/2020 01:38 AM 25 0 - 40 mg/dL Final          Lower extremity venous duplex right  Narrative: STUDY:  Right Lower Extremity Venous Doppler Ultrasound; 2/9/2024 9:19 PM  INDICATION:  Right leg pain, edema and warmth x three months.  History of DVT.  COMPARISON:  None available.  ACCESSION NUMBER(S):  [id number]  ORDERING CLINICIAN:  KATI ALBARADO  TECHNIQUE:    Real-time grayscale, color, and spectral doppler ultrasound imaging of  the right lower extremity veins was performed.  FINDINGS:   There is acute nonocclusive DVT demonstrated within the right  popliteal vein.  The right common femoral, profunda femoral and femoral veins  demonstrated normal compressibility, normal phasic venous flow and  normal response to augmentation.  There is no evidence for echogenic  thrombi.  The visualized deep calf veins are patent.    The contralateral common femoral vein is free of thrombosis.  Impression: Evidence for acute occlusive DVT within the right popliteal vein.   The critical results of the study were discussed with, and  acknowledged by Dr. Yrn Vo, by telephone on 02/09/2024 at  21:45.  Signed by Richard Hudson II, MD  XR tibia fibula right 2 views  Narrative: Interpreted By:  Handy Oviedo,   STUDY:  XR TIBIA FIBULA RIGHT 2 VIEWS; 2/9/2024 7:21 pm      INDICATION:  Signs/Symptoms:atraumatic pain;      COMPARISON:  None available.      ACCESSION NUMBER(S):  [id number]      ORDERING CLINICIAN:  KATI ALBARADO      TECHNIQUE:  Views: Right tibia and fibula, AP and Lateral      FINDINGS:  RESULT: There is no evidence for fracture or dislocation. Joint  spaces appear adequately maintained. No other bony or soft tissue  abnormality is identified.      Impression: No evidence for acute osseous abnormality.      Signed by: Handy  Preet 2/9/2024 7:42 PM  Dictation workstation:   [id number]  XR chest 1 view  Narrative: Interpreted By:  Handy Oviedo,   STUDY:  XR CHEST 1 VIEW; 2/9/2024 7:21 pm      INDICATION:  Signs/Symptoms:chest pain.      COMPARISON:  12/20/2021      ACCESSION NUMBER(S):  [id number]      ORDERING CLINICIAN:  KATI ALBARADO      TECHNIQUE:  1 view of the chest was performed.      FINDINGS:  The lungs are adequately inflated. No acute consolidation. No pleural  effusion. No pneumothorax.  The cardiomediastinal silhouette is  within normal limits.      Impression: No acute cardiopulmonary disease.      Signed by: Handy Oviedo 2/9/2024 7:37 PM  Dictation workstation:   [id number]      Principal Problem:    Cellulitis of right lower leg    Assessment/Plan   53 female patient with a history of cellulitis in right lower leg now with DVT.  EKG is unremarkable  1.  DVT continue current anticoagulation and vascular surgery follow-up.  Continue current IV antibiotic Zosyn and vancomycin.  2.  Atypical chest pain continue current treatment plan outpatient stress testing can be once patient stabilized.  Modify risk factor.  Troponin negative x 3.    Critical care time is spent at bedside includes review of diagnostic tests, labs, and radiographs, serial assessments and management of hemodynamics, EKGs, old echoes, cardiac work-up and coordination of care.  Assessment, impression and plans are reflected in the note above as well as the orders.    Code Status:  Full Code  I spent 55 minutes in the professional and overall care of this patient.  Thony Zambrano MD

## 2024-02-11 NOTE — ED NOTES
Assumed care of pt. VSS, Pt ambulatory to the BR. C/O right lower leg pain 4/10. Call bell within reach.      Myranda Zapata RN  02/10/24 1954

## 2024-02-12 LAB
ATRIAL RATE: 78 BPM
P AXIS: 77 DEGREES
P OFFSET: 179 MS
P ONSET: 118 MS
PR INTERVAL: 184 MS
Q ONSET: 210 MS
QRS COUNT: 13 BEATS
QRS DURATION: 102 MS
QT INTERVAL: 396 MS
QTC CALCULATION(BAZETT): 451 MS
QTC FREDERICIA: 432 MS
R AXIS: -58 DEGREES
T AXIS: 24 DEGREES
T OFFSET: 408 MS
VENTRICULAR RATE: 78 BPM

## 2024-02-12 PROCEDURE — 97165 OT EVAL LOW COMPLEX 30 MIN: CPT | Mod: GO

## 2024-02-12 PROCEDURE — 2500000004 HC RX 250 GENERAL PHARMACY W/ HCPCS (ALT 636 FOR OP/ED): Performed by: INTERNAL MEDICINE

## 2024-02-12 PROCEDURE — 2500000002 HC RX 250 W HCPCS SELF ADMINISTERED DRUGS (ALT 637 FOR MEDICARE OP, ALT 636 FOR OP/ED): Performed by: HOSPITALIST

## 2024-02-12 PROCEDURE — 99232 SBSQ HOSP IP/OBS MODERATE 35: CPT | Performed by: INTERNAL MEDICINE

## 2024-02-12 PROCEDURE — 1210000001 HC SEMI-PRIVATE ROOM DAILY

## 2024-02-12 PROCEDURE — 97161 PT EVAL LOW COMPLEX 20 MIN: CPT | Mod: GP

## 2024-02-12 PROCEDURE — 2500000001 HC RX 250 WO HCPCS SELF ADMINISTERED DRUGS (ALT 637 FOR MEDICARE OP): Performed by: INTERNAL MEDICINE

## 2024-02-12 RX ADMIN — PANTOPRAZOLE SODIUM 40 MG: 40 TABLET, DELAYED RELEASE ORAL at 08:42

## 2024-02-12 RX ADMIN — Medication 2000 UNITS: at 08:41

## 2024-02-12 RX ADMIN — PIPERACILLIN SODIUM AND TAZOBACTAM SODIUM 3.38 G: 3; .375 INJECTION, SOLUTION INTRAVENOUS at 18:15

## 2024-02-12 RX ADMIN — MULTIVITAMIN TABLET 1 TABLET: TABLET at 08:42

## 2024-02-12 RX ADMIN — FUROSEMIDE 20 MG: 20 TABLET ORAL at 08:41

## 2024-02-12 RX ADMIN — PIPERACILLIN SODIUM AND TAZOBACTAM SODIUM 3.38 G: 3; .375 INJECTION, SOLUTION INTRAVENOUS at 06:23

## 2024-02-12 RX ADMIN — FOLIC ACID 1 MG: 1 TABLET ORAL at 08:40

## 2024-02-12 RX ADMIN — APIXABAN 5 MG: 5 TABLET, FILM COATED ORAL at 13:55

## 2024-02-12 RX ADMIN — DICLOFENAC 4 G: 10 GEL TOPICAL at 21:00

## 2024-02-12 RX ADMIN — PIPERACILLIN SODIUM AND TAZOBACTAM SODIUM 3.38 G: 3; .375 INJECTION, SOLUTION INTRAVENOUS at 23:50

## 2024-02-12 ASSESSMENT — COGNITIVE AND FUNCTIONAL STATUS - GENERAL
MOBILITY SCORE: 24
DAILY ACTIVITIY SCORE: 24
DRESSING REGULAR LOWER BODY CLOTHING: A LITTLE
MOBILITY SCORE: 24
DAILY ACTIVITIY SCORE: 23

## 2024-02-12 ASSESSMENT — PAIN - FUNCTIONAL ASSESSMENT
PAIN_FUNCTIONAL_ASSESSMENT: 0-10

## 2024-02-12 ASSESSMENT — PAIN SCALES - GENERAL
PAINLEVEL_OUTOF10: 2
PAINLEVEL_OUTOF10: 0 - NO PAIN
PAINLEVEL_OUTOF10: 5 - MODERATE PAIN
PAINLEVEL_OUTOF10: 5 - MODERATE PAIN
PAINLEVEL_OUTOF10: 0 - NO PAIN

## 2024-02-12 ASSESSMENT — ACTIVITIES OF DAILY LIVING (ADL)
ADL_ASSISTANCE: INDEPENDENT
ADL_ASSISTANCE: INDEPENDENT
BATHING_ASSISTANCE: INDEPENDENT

## 2024-02-12 NOTE — PROGRESS NOTES
Informed that the patient had a nosebleed that began at about 7:30 this evening.  She is currently on Eliquis for right leg DVT 10 mg twice daily, to switch to 5 mg twice daily on the evening of 2/16.  She is on Zosyn for cellulitis and she states that she has had nosebleeds in the past while taking antibiotics at home.  This has not occurred in the hospital with IV antibiotics.  At the time of evaluation, the nosebleed from the right nostril had stopped and inspection of the interior of the right nostril showed only a very small amount of dried blood medially with no active bleeding.  There were pieces of tissue paper in the dust pain with blood in them.    Plan:   Discontinue eliquis for now and re-evaluate tomorrow (unable to hold the order in Epic).  She was concerned about the Zosyn.  After further discussion, we will hold the next dose of Zosyn due at midnight and continue Zosyn thereafter and she is agreeable.

## 2024-02-12 NOTE — NURSING NOTE
"BSSR complete pt with nose bleed tissues provided states \"I told them everytime I take antbiotics I get a nose bleed\"     "

## 2024-02-12 NOTE — CARE PLAN
The patient's goals for the shift include get betteer    The clinical goals for the shift include Maintain safety

## 2024-02-12 NOTE — PROGRESS NOTES
Physical Therapy    Physical Therapy Evaluation    Patient Name: Amy John  MRN: [id number]  Today's Date: 2/12/2024   Time Calculation  Start Time: 1315  Stop Time: 1330  Time Calculation (min): 15 min    Assessment/Plan   PT Assessment  Evaluation/Treatment Tolerance: Patient tolerated treatment well  Medical Staff Made Aware: Yes  Strengths: Ability to acquire knowledge, Premorbid level of function  Barriers to Participation: Comorbidities  End of Session Communication: Bedside nurse  Assessment Comment: Pt demonstrates functional mobility at baseline level, no acute skilled PT needs indicated at this time; educated on benefits of continued mobility during hospital stay with good agreement verbalized; will d/c from PT.  End of Session Patient Position: Bed, 2 rail up, Alarm off, not on at start of session  IP OR SWING BED PT PLAN  Inpatient or Swing Bed: Inpatient  PT Plan  PT Plan: PT Eval only  PT Eval Only Reason: At baseline function  PT Frequency: PT eval only  PT Discharge Recommendations: No further acute PT  PT Recommended Transfer Status: Independent  PT - OK to Discharge: Yes    Subjective   General Visit Information:  General  Reason for Referral: impaired functional mobility (Pt is a 53 year-old F admitted from home with c/o blisters on RLE and chest pain. (+) acute DVT RLE; pt on Eliquis)  Referred By: Dr. Peña,   Past Medical History Relevant to Rehab:  Acute deep vein thrombosis (DVT) of right iliofemoral vein (CMS/HCC)     BPPV (benign paroxysmal positional vertigo)     Cardiac murmur     Mitral valve prolapse     Sickle cell trait (CMS/HCC)     Venous insufficiency  Family/Caregiver Present: No  Prior to Session Communication: Bedside nurse  Patient Position Received: Bed, 2 rail up, Alarm off, not on at start of session  Preferred Learning Style: verbal  General Comment: Pt cleared for therapy via RN, received in supine, NAD, agreeable to participate in therapy.  Home Living:  Home  Living  Type of Home: House  Lives With: Dependent children (aged 15 and 13)  Home Adaptive Equipment: None  Home Layout: Two level, Stairs to alternate level with rails, Laundry in basement  Alternate Level Stairs-Rails: Right  Alternate Level Stairs-Number of Steps: 12  Home Access: Stairs to enter with rails  Entrance Stairs-Rails: Right  Entrance Stairs-Number of Steps: 3  Bathroom Shower/Tub: Tub/shower unit  Bathroom Toilet: Standard  Bathroom Equipment: None  Prior Level of Function:  Prior Function Per Pt/Caregiver Report  Level of [place]: Independent with ADLs and functional transfers, Independent with homemaking with ambulation  Receives Help From: Family  ADL Assistance: Independent  Homemaking Assistance: Independent  Ambulatory Assistance: Independent  Vocational: Other (Comment)  Hand Dominance: Right  Precautions:  Precautions  Hearing/Visual Limitations: none  Medical Precautions: Other (comment) (DVT precautions)    Objective   Pain:  Pain Assessment  Pain Assessment: 0-10  Pain Score: 5 - Moderate pain  Pain Interventions: Ambulation/increased activity  Cognition:  Cognition  Overall Cognitive Status: Within Functional Limits    General Assessments:  General Observation  General Observation: RLE wrapped; clean/intact    Activity Tolerance  Endurance: Tolerates 10 - 20 min exercise with multiple rests    Sensation  Light Touch: No apparent deficits  Sensation Comment: pt denies paresthesias    Strength  Strength Comments: BLE > 4/5  Strength  Strength Comments: BLE > 4/5    Coordination  Movements are Fluid and Coordinated: Yes    Postural Control  Postural Control: Within Functional Limits    Static Sitting Balance  Static Sitting-Balance Support: No upper extremity supported, Feet supported  Static Sitting-Level of Assistance: Independent    Static Standing Balance  Static Standing-Balance Support: No upper extremity supported  Static Standing-Level of Assistance: Independent  Functional  Assessments:     Bed Mobility  Bed Mobility: Yes  Bed Mobility 1  Bed Mobility 1: Supine to sitting  Level of Assistance 1: Independent    Transfers  Transfer: Yes  Transfer 1  Transfer From 1: Sit to  Transfer to 1: Stand  Technique 1: Sit to stand  Transfer Level of Assistance 1: Independent  Transfers 2  Transfer From 2: Stand to  Transfer to 2: Sit  Technique 2: Stand to sit  Transfer Level of Assistance 2: Independent    Ambulation/Gait Training  Ambulation/Gait Training Performed: Yes  Ambulation/Gait Training 1  Surface 1: Level tile  Device 1: No device  Assistance 1: Independent  Quality of Gait 1:  (decreased charlee, reciprocating pattern, steady gait)  Comments/Distance (ft) 1: 75', 100', 25'    Stairs  Stairs: Yes  Stairs  Rails 1: Right  Curb Step 1: No  Device 1: Railing  Assistance 1: Modified independent (non-reciprocating pattern to ascend/descend)  Comment/Number of Steps 1: 9       Extremity/Trunk Assessments:  RLE   RLE : Within Functional Limits  LLE   LLE : Within Functional Limits  Outcome Measures:  [facility] Basic Mobility  Turning from your back to your side while in a flat bed without using bedrails: None  Moving from lying on your back to sitting on the side of a flat bed without using bedrails: None  Moving to and from bed to chair (including a wheelchair): None  Standing up from a chair using your arms (e.g. wheelchair or bedside chair): None  To walk in hospital room: None  Climbing 3-5 steps with railing: None  Basic Mobility - Total Score: 24    Encounter Problems       Encounter Problems (Resolved)       Mobility       STG - Patient will ascend and descend a flight of stairs with use of one handrail and modified independence. (Met)       Start:  02/12/24    Expected End:  02/26/24    Resolved:  02/12/24                Education Documentation  Mobility Training, taught by Graciela Cornejo, PT at 2/12/2024  2:05 PM.  Learner: Patient  Readiness: Eager  Method: Explanation,  Demonstration  Response: Verbalizes Understanding    Education Comments  No comments found.

## 2024-02-12 NOTE — PROGRESS NOTES
Amy John is a 53 y.o. female on day 3 of admission presenting with Cellulitis of right lower leg.      Subjective   Has been getting Zosyn for leg cellulitis and loading dose Eliquis for DVT.  Last night she had a significant nosebleed.  Eliquis has been put on hold.  Patient is very concerned about the choice of antibiotics as she is convinced this causes her nosebleeds.    Objective   Trace edema in her left leg right leg is extensively wrapped from the toes to the shin.    Last Recorded Vitals  /75   Pulse 70   Temp 36.8 °C (98.2 °F) (Oral)   Resp 17   Wt 113 kg (250 lb)   SpO2 96%   Intake/Output last 3 Shifts:    Intake/Output Summary (Last 24 hours) at 2/12/2024 1239  Last data filed at 2/12/2024 0653  Gross per 24 hour   Intake 350 ml   Output --   Net 350 ml       Admission Weight  Weight: 113 kg (250 lb) (02/09/24 1851)    Daily Weight  02/11/24 : 113 kg (250 lb)    Image Results  ECG 12 lead  Normal sinus rhythm  Right atrial enlargement  Left anterior fascicular block  Possible Anterolateral infarct , age undetermined  Abnormal ECG  When compared with ECG of 24-MAR-2022 09:23,  No significant change was found          Assessment/Plan      DVT, right leg cellulitis and nosebleeds last night.    Resume Eliquis 5 mg twice a day.  Ordering saline nasal spray to try to keep nasal membranes moist.  I have asked infectious disease to figure out the choice of antibiotics.  Wound care as currently ordered.  Will need home care and vascular surgery follow-up for wound care.  Continue other meds and check labs tomorrow morning.  No discharge today due to challenges getting and keeping her on anticoagulation         Tonny Ross MD

## 2024-02-12 NOTE — PROGRESS NOTES
Subjective Data:  Patient denies having chest pain.  Her lower extremity edema and pain is improving.  Denies having nausea vomiting.    Overnight Events:    Telemetry overnight reviewed no events.     Objective Data:  Last Recorded Vitals:  Vitals:    02/11/24 2300 02/12/24 0835 02/12/24 0840 02/12/24 1456   BP: 123/67 111/75 111/75 (!) 111/43   BP Location: Left arm Left arm  Right arm   Patient Position: Lying Sitting  Lying   Pulse: 68 70 70 76   Resp: 16 17  17   Temp: 36.8 °C (98.2 °F) 36.8 °C (98.2 °F)  36.9 °C (98.4 °F)   TempSrc: Axillary Oral  Oral   SpO2: 96% 96%  95%   Weight:       Height:           Last Labs:  CBC - 2/9/2024:  8:05 PM  5.1 13.3 205    41.2      CMP - 2/9/2024:  8:05 PM  9.2 7.1 17 --- 0.3   _ 3.9 23 69      PTT - No results in last year.  _   _ _     BNP   Date/Time Value Ref Range Status   03/26/2022 08:59 AM 11 0 - 99 pg/mL Final     Comment:     .  <100 pg/mL - Heart failure unlikely  100-299 pg/mL - Intermediate probability of acute heart  .               failure exacerbation. Correlate with clinical  .               context and patient history.    >=300 pg/mL - Heart Failure likely. Correlate with clinical  .               context and patient history.  BNP testing is performed using different testing   methodology at [facility] than at other   [facility]. Direct result comparisons should   only be made within the same method.     12/20/2021 07:09 PM 16 0 - 99 pg/mL Final     Comment:     .  <100 pg/mL - Heart failure unlikely  100-299 pg/mL - Intermediate probability of acute heart  .               failure exacerbation. Correlate with clinical  .               context and patient history.    >=300 pg/mL - Heart Failure likely. Correlate with clinical  .               context and patient history.  BNP testing is performed using different testing   methodology at [facility] than at other   [facility] hospitals. Direct result comparisons should   only be  "made within the same method.       HGBA1C   Date/Time Value Ref Range Status   08/06/2020 01:38 AM 5.4 % Final     Comment:          Diagnosis of Diabetes-Adults   Non-Diabetic: < or = 5.6%   Increased risk for developing diabetes: 5.7-6.4%   Diagnostic of diabetes: > or = 6.5%  .       Monitoring of Diabetes                Age (y)     Therapeutic Goal (%)   Adults:          >18           <7.0   Pediatrics:    13-18           <7.5                   7-12           <8.0                   0- 6            7.5-8.5   American Diabetes Association. Diabetes Care 33(S1), Jan 2010.   Hemoglobin variant detected which does not interfere    with determination of Hemoglobin A1c. Hemoglobin   identification can be ordered to characterize the variant   if clinically indicated.       VLDL   Date/Time Value Ref Range Status   03/26/2022 08:59 AM 19 0 - 40 mg/dL Final   08/06/2020 01:38 AM 25 0 - 40 mg/dL Final      Last I/O:  I/O last 3 completed shifts:  In: 350 (3.1 mL/kg) [IV Piggyback:350]  Out: - (0 mL/kg)   Weight: 113.4 kg     Past Cardiology Tests (Last 3 Years):  EKG:  ECG 12 lead 02/09/2024 (Preliminary)    Echo:  No results found for this or any previous visit from the past 1095 days.    Ejection Fractions:  No results found for: \"EF\"  Cath:  No results found for this or any previous visit from the past 1095 days.    Stress Test:  No results found for this or any previous visit from the past 1095 days.    Cardiac Imaging:  No results found for this or any previous visit from the past 1095 days.      Inpatient Medications:  Scheduled medications   Medication Dose Route Frequency    apixaban  5 mg oral BID    cholecalciferol  2,000 Units oral Daily    diclofenac sodium  4 g Topical BID    folic acid  1 mg oral Daily    furosemide  20 mg oral Daily    morphine  4 mg intravenous Once    multivitamin  1 tablet oral Daily    ondansetron  4 mg intravenous Once    pantoprazole  40 mg oral Daily    piperacillin-tazobactam  3.375 " g intravenous q6h     PRN medications   Medication    acetaminophen    Or    acetaminophen    Or    acetaminophen    ammonium lactate    melatonin    polyethylene glycol    sodium chloride    traMADol     Continuous Medications   Medication Dose Last Rate       Physical Exam:  General: Patient is in no acute distress.  HEENT: atraumatic normocephalic.  Neck: is supple jugular venous pressure within normal limits no thyromegaly.  Cardiovascular regular rate and rhythm normal heart sounds no murmurs rubs or gallops.  Lungs: clear to auscultation bilaterally.  Abdomen: is soft nontender.  Extremities warm to touch no edema.  Right lower extremity is covered with Ace wrap.  1+ edema.  Left lower extremity within normal limits.  Assessment/Plan   1 chest pain.  Patient with history of mitral valve prolapse atypical chest pain currently has no chest pain.  Recommend monitoring and outpatient follow-up.    2.  Right lower extremity DVT and cellulitis.  Patient has popliteal DVT developed epistaxis with the higher dose Eliquis however she reminds me she reports to me that usually she develops epistaxis with antibiotics.  At this point my recommendation is to restart Eliquis since she has acute DVT.  Treated with antibiotics will reassess in a.m.    3.  Hypotension.  Patient blood pressure drops with beta-blockers do not see any indication for beta-blockers for the time being we will hold will monitor heart rate and blood pressure.  Peripheral IV 02/09/24 18 G Right Antecubital (Active)   Site Assessment Clean;Dry;Intact 02/12/24 0900   Dressing Status Clean;Dry 02/12/24 0900   Number of days: 3       Code Status:  Full Code      Andre Cardenas MD

## 2024-02-12 NOTE — CARE PLAN
The patient's goals for the shift include get betteer    The clinical goals for the shift include safety      Problem: Pain  Goal: My pain/discomfort is manageable  Outcome: Progressing     Problem: Safety  Goal: Patient will be injury free during hospitalization  Outcome: Progressing  Goal: I will remain free of falls  Outcome: Progressing     Problem: Daily Care  Goal: Daily care needs are met  Outcome: Progressing     Problem: Psychosocial Needs  Goal: Demonstrates ability to cope with hospitalization/illness  Outcome: Progressing  Goal: Collaborate with me, my family, and caregiver to identify my specific goals  Outcome: Progressing     Problem: Skin  Goal: Decreased wound size/increased tissue granulation at next dressing change  Outcome: Progressing  Goal: Participates in plan/prevention/treatment measures  Outcome: Progressing  Goal: Prevent/manage excess moisture  Outcome: Progressing  Goal: Prevent/minimize sheer/friction injuries  Outcome: Progressing  Goal: Promote/optimize nutrition  Outcome: Progressing  Goal: Promote skin healing  Outcome: Progressing

## 2024-02-12 NOTE — PROGRESS NOTES
Occupational Therapy    Evaluation    Patient Name: Amy John  MRN: [id number]  Today's Date: 2/12/2024  Time Calculation  Start Time: 1438  Stop Time: 1448  Time Calculation (min): 10 min    Assessment  IP OT Assessment  OT Assessment: Pt is at her functional baseline with ADL's, other than LB dressing for RLE which AE was educated, and functional transfers/mobility. No further need for skilled OT.  Prognosis: Excellent  Evaluation/Treatment Tolerance: Patient tolerated treatment well  End of Session Communication: Bedside nurse  End of Session Patient Position: Bed, 3 rail up (Needs in reach.)    Plan:  No Skilled OT: No acute OT goals identified  OT Frequency: OT eval only  OT Discharge Recommendations: No further acute OT  OT - OK to Discharge: Yes    Subjective     General:  General  Reason for Referral: RLE Cellulitis  Referred By: Dr. Casey DO  Past Medical History Relevant to Rehab:  Acute deep vein thrombosis (DVT) of right iliofemoral vein (CMS/HCC)     BPPV (benign paroxysmal positional vertigo)     Cardiac murmur     Mitral valve prolapse     Sickle cell trait (CMS/HCC)     Venous insufficiency  Family/Caregiver Present: No  Prior to Session Communication: Bedside nurse  Patient Position Received: Bed, 3 rail up, Alarm off, not on at start of session  General Comment: Pt is a 53 year-old F admitted from home with c/o blisters on RLE and chest pain. (+) acute DVT RLE; pt on Eliquis.    Precautions:  Medical Precautions: Other (comment) (DVT precautions)    Pain:  Pain Assessment  Pain Assessment: 0-10  Pain Score: 5 - Moderate pain  Pain Type: Acute pain  Pain Location: Leg  Pain Orientation: Right    Objective     Cognition:  Overall Cognitive Status: Within Functional Limits  Orientation Level: Oriented X4    Home Living:  Type of Home: House  Lives With: Dependent children (15 yo and 12 yo)  Home Adaptive Equipment: None  Home Layout: Two level, Stairs to alternate level with rails, Laundry in  basement  Alternate Level Stairs-Rails: Right  Alternate Level Stairs-Number of Steps: 12  Home Access: Stairs to enter with rails  Entrance Stairs-Rails: Right  Entrance Stairs-Number of Steps: 3  Bathroom Shower/Tub: Tub/shower unit  Bathroom Toilet: Standard  Bathroom Equipment: None     Prior Function:  Level of [place]: Independent with ADLs and functional transfers, Independent with homemaking with ambulation  Receives Help From: Family  ADL Assistance: Independent  Homemaking Assistance: Independent  Ambulatory Assistance: Independent  Hand Dominance: Right    ADL:  Eating Assistance: Independent  Grooming Assistance: Independent  Bathing Assistance: Independent  UE Dressing Assistance: Independent  LE Dressing Assistance: Minimal  LE Dressing Deficit: Don/doff R sock (Pt educated on use of a sock aide for donning socks until RLE improves.)  Toileting Assistance with Device: Independent    Activity Tolerance:  Activity Tolerance Comments: WFL    Bed Mobility/Transfers: Bed Mobility  Bed Mobility:  (Pt completed bed mobility independently.)    Transfers  Transfer:  (Pt completed sit<>stand independently.)    Sensation:  Sensation Comment: BUE's WFL    Strength:  Strength Comments: BUBREANA's WFL    Coordination:  Coordination Comment: BUBREANA's WFL     Hand Function:  Hand Function  Gross Grasp: Functional  Coordination: Functional      Outcome Measures: [facility] Daily Activity  Putting on and taking off regular lower body clothing: A little  Bathing (including washing, rinsing, drying): None  Putting on and taking off regular upper body clothing: None  Toileting, which includes using toilet, bedpan or urinal: None  Taking care of personal grooming such as brushing teeth: None  Eating Meals: None  Daily Activity - Total Score: 23      Education Documentation  ADL Training, taught by Francis Roa Jr., OT at 2/12/2024  3:05 PM.  Learner: Patient  Readiness: Acceptance  Method: Explanation  Response: Verbalizes  Understanding    Education Comments  No comments found.

## 2024-02-12 NOTE — NURSING NOTE
Dr. Cardenas in with pt at this time, pt being interviewed and assessed, plan of care being discussed...    Physician made aware of pt's apixaban (Eliquis) tablet 10 mgDose: 10 mg  :  oral  :  2 times daily was discontinued last evening following a nose bleed...pt's reported hx of nosebleed with antibiotic therapy...    Physician states he will communicate with Hospitalist then provide further instructions....    Awaiting return response...

## 2024-02-12 NOTE — NURSING NOTE
BSSR received.  Pt actively bleeding from her nose.  Pt stated  she bleeds anytime she takes antibiotics.  Tissue provided to pt.

## 2024-02-13 ENCOUNTER — DOCUMENTATION (OUTPATIENT)
Dept: HOME HEALTH SERVICES | Facility: HOME HEALTH | Age: 54
End: 2024-02-13
Payer: COMMERCIAL

## 2024-02-13 ENCOUNTER — HOME HEALTH ADMISSION (OUTPATIENT)
Dept: HOME HEALTH SERVICES | Facility: HOME HEALTH | Age: 54
End: 2024-02-13
Payer: COMMERCIAL

## 2024-02-13 VITALS
BODY MASS INDEX: 42.68 KG/M2 | OXYGEN SATURATION: 96 % | HEIGHT: 64 IN | WEIGHT: 250 LBS | DIASTOLIC BLOOD PRESSURE: 71 MMHG | HEART RATE: 80 BPM | RESPIRATION RATE: 18 BRPM | TEMPERATURE: 97.7 F | SYSTOLIC BLOOD PRESSURE: 106 MMHG

## 2024-02-13 LAB
ANION GAP SERPL CALC-SCNC: 11 MMOL/L
BUN SERPL-MCNC: 14 MG/DL (ref 8–25)
CALCIUM SERPL-MCNC: 9.5 MG/DL (ref 8.5–10.4)
CHLORIDE SERPL-SCNC: 106 MMOL/L (ref 97–107)
CO2 SERPL-SCNC: 24 MMOL/L (ref 24–31)
CREAT SERPL-MCNC: 1.2 MG/DL (ref 0.4–1.6)
EGFRCR SERPLBLD CKD-EPI 2021: 54 ML/MIN/1.73M*2
ERYTHROCYTE [DISTWIDTH] IN BLOOD BY AUTOMATED COUNT: 13.2 % (ref 11.5–14.5)
GLUCOSE SERPL-MCNC: 100 MG/DL (ref 65–99)
HCT VFR BLD AUTO: 43 % (ref 36–46)
HGB BLD-MCNC: 13.8 G/DL (ref 12–16)
MCH RBC QN AUTO: 28 PG (ref 26–34)
MCHC RBC AUTO-ENTMCNC: 32.1 G/DL (ref 32–36)
MCV RBC AUTO: 87 FL (ref 80–100)
NRBC BLD-RTO: 0 /100 WBCS (ref 0–0)
PLATELET # BLD AUTO: 217 X10*3/UL (ref 150–450)
POTASSIUM SERPL-SCNC: 3.9 MMOL/L (ref 3.4–5.1)
RBC # BLD AUTO: 4.92 X10*6/UL (ref 4–5.2)
SODIUM SERPL-SCNC: 141 MMOL/L (ref 133–145)
WBC # BLD AUTO: 6.5 X10*3/UL (ref 4.4–11.3)

## 2024-02-13 PROCEDURE — 2500000001 HC RX 250 WO HCPCS SELF ADMINISTERED DRUGS (ALT 637 FOR MEDICARE OP): Performed by: INTERNAL MEDICINE

## 2024-02-13 PROCEDURE — 80048 BASIC METABOLIC PNL TOTAL CA: CPT | Performed by: INTERNAL MEDICINE

## 2024-02-13 PROCEDURE — 2500000004 HC RX 250 GENERAL PHARMACY W/ HCPCS (ALT 636 FOR OP/ED): Performed by: INTERNAL MEDICINE

## 2024-02-13 PROCEDURE — 85027 COMPLETE CBC AUTOMATED: CPT | Performed by: INTERNAL MEDICINE

## 2024-02-13 PROCEDURE — 36415 COLL VENOUS BLD VENIPUNCTURE: CPT | Performed by: INTERNAL MEDICINE

## 2024-02-13 PROCEDURE — 99232 SBSQ HOSP IP/OBS MODERATE 35: CPT | Performed by: INTERNAL MEDICINE

## 2024-02-13 PROCEDURE — 2500000002 HC RX 250 W HCPCS SELF ADMINISTERED DRUGS (ALT 637 FOR MEDICARE OP, ALT 636 FOR OP/ED): Performed by: HOSPITALIST

## 2024-02-13 RX ORDER — DICYCLOMINE HYDROCHLORIDE 10 MG/1
10 CAPSULE ORAL ONCE
Status: COMPLETED | OUTPATIENT
Start: 2024-02-13 | End: 2024-02-13

## 2024-02-13 RX ORDER — ASPIRIN 325 MG
1 TABLET, DELAYED RELEASE (ENTERIC COATED) ORAL EVERY EVENING
Qty: 45 G | Refills: 0 | Status: SHIPPED | OUTPATIENT
Start: 2024-02-13

## 2024-02-13 RX ORDER — PANTOPRAZOLE SODIUM 40 MG/1
40 TABLET, DELAYED RELEASE ORAL DAILY
Qty: 30 TABLET | Refills: 2 | Status: SHIPPED | OUTPATIENT
Start: 2024-02-14

## 2024-02-13 RX ORDER — CEPHALEXIN 500 MG/1
500 CAPSULE ORAL 3 TIMES DAILY
Qty: 21 CAPSULE | Refills: 0 | Status: SHIPPED | OUTPATIENT
Start: 2024-02-13

## 2024-02-13 RX ORDER — TRAMADOL HYDROCHLORIDE 50 MG/1
50 TABLET ORAL EVERY 8 HOURS PRN
Qty: 20 TABLET | Refills: 0 | Status: SHIPPED | OUTPATIENT
Start: 2024-02-13

## 2024-02-13 RX ORDER — FLUCONAZOLE 100 MG/1
200 TABLET ORAL ONCE
Status: COMPLETED | OUTPATIENT
Start: 2024-02-13 | End: 2024-02-13

## 2024-02-13 RX ADMIN — FUROSEMIDE 20 MG: 20 TABLET ORAL at 11:49

## 2024-02-13 RX ADMIN — FLUCONAZOLE 200 MG: 100 TABLET ORAL at 14:48

## 2024-02-13 RX ADMIN — Medication 2000 UNITS: at 08:31

## 2024-02-13 RX ADMIN — PANTOPRAZOLE SODIUM 40 MG: 40 TABLET, DELAYED RELEASE ORAL at 08:31

## 2024-02-13 RX ADMIN — MULTIVITAMIN TABLET 1 TABLET: TABLET at 08:30

## 2024-02-13 RX ADMIN — Medication 1 SPRAY: at 14:47

## 2024-02-13 RX ADMIN — DICYCLOMINE HYDROCHLORIDE 10 MG: 10 CAPSULE ORAL at 08:31

## 2024-02-13 RX ADMIN — PIPERACILLIN SODIUM AND TAZOBACTAM SODIUM 3.38 G: 3; .375 INJECTION, SOLUTION INTRAVENOUS at 05:55

## 2024-02-13 RX ADMIN — APIXABAN 5 MG: 5 TABLET, FILM COATED ORAL at 08:31

## 2024-02-13 RX ADMIN — FOLIC ACID 1 MG: 1 TABLET ORAL at 08:31

## 2024-02-13 ASSESSMENT — PAIN SCALES - GENERAL
PAINLEVEL_OUTOF10: 0 - NO PAIN

## 2024-02-13 ASSESSMENT — PAIN - FUNCTIONAL ASSESSMENT
PAIN_FUNCTIONAL_ASSESSMENT: 0-10

## 2024-02-13 NOTE — PROGRESS NOTES
Patient with an active discharge. Patient will return home with [facility] RN to assist with dressing changes. Internal referral placed for [facility]. Will follow as needed.     **PATIENT WITH A SAFE DISCHARGE PLAN      Halima Gonzales RN

## 2024-02-13 NOTE — PROGRESS NOTES
Amy John is a 53 y.o. female on day 3 of admission presenting with Cellulitis of right lower leg.    Subjective   Interval History:    afebrile, no chills  Moderate like leg pain  Refusing antibiotics-concerned about intermittent nosebleeds  On anticoagulation  Review of Systems    Objective   Range of Vitals (last 24 hours)  Heart Rate:  [68-76]   Temp:  [36.8 °C (98.2 °F)-36.9 °C (98.4 °F)]   Resp:  [16-17]   BP: (111-127)/(43-75)   SpO2:  [95 %-96 %]   Daily Weight  02/11/24 : 113 kg (250 lb)    Body mass index is 42.91 kg/m².    Physical Exam  Constitutional:       Appearance: Normal appearance.   HENT:      Head: Normocephalic and atraumatic.      Nose: Nose normal.   Eyes:      Extraocular Movements: Extraocular movements intact.      Conjunctiva/sclera: Conjunctivae normal.   Cardiovascular:      Rate and Rhythm: Regular rhythm.      Heart sounds: Normal heart sounds.   Pulmonary:      Breath sounds: Normal breath sounds.   Abdominal:      General: Bowel sounds are normal.      Palpations: Abdomen is soft.   Musculoskeletal:      Cervical back: Neck supple.      Right lower leg: Edema present.   Skin:     Comments: Right lower extremity erythema, anterior and posterior leg ulcers   Neurological:      Mental Status: She is alert and oriented to person, place, and time.   Psychiatric:         Mood and Affect: Mood normal.         Behavior: Behavior normal.     Antibiotics  famotidine PF (Pepcid) injection 20 mg  sodium chloride 0.9 % bolus 1,000 mL  acetaminophen (Tylenol) tablet 650 mg  morphine injection 4 mg  ondansetron (Zofran) injection 4 mg  piperacillin-tazobactam-dextrose (Zosyn) IV 3.375 g  vancomycin (Xellia) 1 g in 200 mL (Xellia) IVPB 1,000 mg  apixaban (Eliquis) tablet 10 mg  apixaban (Eliquis) tablet 5 mg        cholecalciferol (Vitamin D-3) tablet  acetaminophen (Tylenol) tablet    folic acid (Folvite) tablet        dutasteride (Avodart) capsule      naltrexone (Depade) tablet    ondansetron  (Zofran-ODT) disintegrating tablet  furosemide (Lasix) tablet  metoprolol tartrate (Lopressor) tablet  ammonium lactate (Lac-Hydrin) 12 % lotion 1 Application  fluocinonide (Lidex) 0.05 % ointment  cholecalciferol (Vitamin D-3) tablet 2,000 Units  clobetasol (Temovate) 0.05 % cream  diclofenac sodium (Voltaren) 1 % gel 4 g  fluocinonide (Lidex) 0.05 % cream 1 Application  folic acid (Folvite) tablet 1 mg  furosemide (Lasix) tablet 20 mg  metoprolol tartrate (Lopressor) tablet 12.5 mg  multivitamin 1 tablet  piperacillin-tazobactam-dextrose (Zosyn) IV 3.375 g  apixaban (Eliquis) tablet 10 mg  apixaban (Eliquis) tablet 5 mg  acetaminophen (Tylenol) tablet 650 mg  acetaminophen (Tylenol) oral liquid 650 mg  acetaminophen (Tylenol) suppository 650 mg  melatonin tablet 5 mg  polyethylene glycol (Glycolax, Miralax) packet 17 g  apixaban (Eliquis) tablet 10 mg  apixaban (Eliquis) tablet 5 mg  vancomycin-diluent combo no.1 (Xellia) IVPB 750 mg  traMADol (Ultram) tablet 50 mg  pantoprazole (ProtoNix) EC tablet 40 mg  apixaban (Eliquis) tablet 5 mg  sodium chloride (Ocean) 0.65 % nasal spray 1 spray      Relevant Results  Labs              CrCl cannot be calculated (Patient's most recent lab result is older than the maximum 3 days allowed.).  C-Reactive Protein   Date Value Ref Range Status   02/09/2024 1.20 0.00 - 2.00 mg/dL Final     Microbiology   reviewed-blood cultures pending, negative urine culture  Imaging  ECG 12 lead    Result Date: 2/12/2024  Normal sinus rhythm Right atrial enlargement Left anterior fascicular block Possible Anterolateral infarct , age undetermined Abnormal ECG When compared with ECG of 24-MAR-2022 09:23, No significant change was found    Lower extremity venous duplex right    Result Date: 2/9/2024  STUDY: Right Lower Extremity Venous Doppler Ultrasound; 2/9/2024 9:19 PM INDICATION: Right leg pain, edema and warmth x three months.  History of DVT. COMPARISON: None available. ACCESSION NUMBER(S):  [id number] ORDERING CLINICIAN: KATI ALBARADO TECHNIQUE:  Real-time grayscale, color, and spectral doppler ultrasound imaging of the right lower extremity veins was performed. FINDINGS: There is acute nonocclusive DVT demonstrated within the right popliteal vein. The right common femoral, profunda femoral and femoral veins demonstrated normal compressibility, normal phasic venous flow and normal response to augmentation.  There is no evidence for echogenic thrombi.  The visualized deep calf veins are patent.  The contralateral common femoral vein is free of thrombosis.    Evidence for acute occlusive DVT within the right popliteal vein. The critical results of the study were discussed with, and acknowledged by Dr. Yrn Vo, by telephone on 02/09/2024 at 21:45. Signed by Richard Hudson II, MD    XR tibia fibula right 2 views    Result Date: 2/9/2024  Interpreted By:  Handy Oviedo, STUDY: XR TIBIA FIBULA RIGHT 2 VIEWS; 2/9/2024 7:21 pm   INDICATION: Signs/Symptoms:atraumatic pain;   COMPARISON: None available.   ACCESSION NUMBER(S): [id number]   ORDERING CLINICIAN: KATI ALBARADO   TECHNIQUE: Views: Right tibia and fibula, AP and Lateral   FINDINGS: RESULT: There is no evidence for fracture or dislocation. Joint spaces appear adequately maintained. No other bony or soft tissue abnormality is identified.       No evidence for acute osseous abnormality.   Signed by: Handy Oviedo 2/9/2024 7:42 PM Dictation workstation:   [id number]    XR chest 1 view    Result Date: 2/9/2024  Interpreted By:  Handy Oviedo, STUDY: XR CHEST 1 VIEW; 2/9/2024 7:21 pm   INDICATION: Signs/Symptoms:chest pain.   COMPARISON: 12/20/2021   ACCESSION NUMBER(S): [id number]   ORDERING CLINICIAN: KATI ALBARADO   TECHNIQUE: 1 view of the chest was performed.   FINDINGS: The lungs are adequately inflated. No acute consolidation. No pleural effusion. No pneumothorax.  The cardiomediastinal silhouette is within normal limits.       No  acute cardiopulmonary disease.   Signed by: Handy Oviedo 2/9/2024 7:37 PM Dictation workstation:   [id number]    XR ribs left 2 views    Result Date: 1/28/2024  * * *Final Report* * * DATE OF EXAM: Jan 28 2024  1:00PM   EUX   5243  -  XR RIB/CHST 3V AP RIB/OBL/CHST L  / ACCESSION #  [id number] PROCEDURE REASON: Trauma      * * * * Physician Interpretation * * * * RESULT: HISTORY:  FELL PAST MONDAY HITTING SIDE ON STOOL TECHNIQUE: XR RIB/CHST 3V AP RIB/OBL/CHST L    Laterality:  LEFT    Number of different views (projections): 1 CHEST 2 RIBS COMPARISON: Chest radiograph 06/12/2022 RESULT: No acute displaced rib fracture. The imaged lung fields are clear with no focal infiltrate.  No pleural effusion or pneumothorax.  Stable cardiomediastinal silhouette. ---------------------------------------------    IMPRESSION: No acute displaced rib fracture. Transcribed Using Voice Recognition Transcribe Date/Time: Jan 28 2024  1:24P Dictated by: MIGUELITO LYNCH MD This examination was interpreted and the report reviewed and electronically signed by: MIGEULITO LYNCH MD on Jan 28 2024  1:27PM  EST    CT abdomen pelvis wo IV contrast    Result Date: 1/28/2024  * * *Final Report* * * DATE OF EXAM: Jan 28 2024 12:58PM   EUC   0531  -  CT ABD/PEL WO IVCON  / ACCESSION #  [id number] PROCEDURE REASON: Abdominal trauma, blunt      * * * * Physician Interpretation * * * * RESULT: EXAMINATION:  CT ABDOMEN AND PELVIS WITHOUT IV CONTRAST CLINICAL HISTORY: Fall.  Left-sided abdominal pain. TECHNIQUE: Non-IV contrast imaging of the abdomen and pelvis was performed using standard technique, scanning from just above the dome of the diaphragm to the symphysis pubis.  Unenhanced imaging is limited for the evaluation of some intra-abdominal and pelvic pathology. MQ:  CTAPWO_3 Contrast: IV: None Oral:  None CT Radiation dose: Integrated Dose-length product (DLP) for this visit =   386 mGy*cm. CT Dose Reduction Employed: Automated exposure  control(AEC) and iterative recon COMPARISON: CT abdomen pelvis 12/29/2022 RESULT: Abdomen / Pelvis: Liver: Hepatic steatosis. Biliary: The gallbladder is unremarkable. Spleen: No splenomegaly. Pancreas: Unremarkable. Adrenals: No mass. Kidneys: No hydronephrosis or nephrolithiasis.  2.4 cm right lower pole cyst. GI Tract: No bowel dilation. Lymph Nodes: No lymphadenopathy. Mesentery/peritoneum: No ascites. Retroperitoneum: No mass. Vasculature: No abdominal aortic or iliac artery aneurysm. Pelvis: No mass or ascites. Bones/Soft Tissues: No acute osseous abnormality. Lower thorax: Stable 2 mm peripheral left lower lobe nodule (2:6).  (topogram) images: No additional findings.    IMPRESSION: No acute abnormality in the abdomen or pelvis. Hepatic steatosis. Transcribed Using Voice Recognition Transcribe Date/Time: Jan 28 2024  1:10P Dictated by: MIGUELITO LYNCH MD This examination was interpreted and the report reviewed and electronically signed by: MIGUELITO LYNCH MD on Jan 28 2024  1:21PM  EST     Assessment/Plan   right leg cellulitis-slowly resolving  Right leg venous ulcers  Right lower extremity DVT       Continue Zosyn  Right leg wound culture  Anticoagulation  Local care   monitor temperature and WBC  Vascular surgery follow-up      Omega Lakhani MD

## 2024-02-13 NOTE — PROGRESS NOTES
Subjective Data:  Patient doing better.  No more epistaxis.  Denies having chest pain.    Overnight Events:    Telemetry reviewed no events     Objective Data:  Last Recorded Vitals:  Vitals:    02/12/24 0840 02/12/24 1456 02/12/24 2300 02/13/24 0700   BP: 111/75 (!) 111/43 122/71 116/59   BP Location:  Right arm Left arm Right arm   Patient Position:  Lying Lying Lying   Pulse: 70 76 79 68   Resp:  17 18 18   Temp:  36.9 °C (98.4 °F) 36.7 °C (98.1 °F) 36.6 °C (97.9 °F)   TempSrc:  Oral Oral Oral   SpO2:  95% 97% 97%   Weight:       Height:           Last Labs:  CBC - 2/13/2024:  6:12 AM  6.5 13.8 217    43.0      CMP - 2/13/2024:  6:12 AM  9.5 7.1 17 --- 0.3   _ 3.9 23 69      PTT - No results in last year.  _   _ _     BNP   Date/Time Value Ref Range Status   03/26/2022 08:59 AM 11 0 - 99 pg/mL Final     Comment:     .  <100 pg/mL - Heart failure unlikely  100-299 pg/mL - Intermediate probability of acute heart  .               failure exacerbation. Correlate with clinical  .               context and patient history.    >=300 pg/mL - Heart Failure likely. Correlate with clinical  .               context and patient history.  BNP testing is performed using different testing   methodology at [facility] than at other   [facility]. Direct result comparisons should   only be made within the same method.     12/20/2021 07:09 PM 16 0 - 99 pg/mL Final     Comment:     .  <100 pg/mL - Heart failure unlikely  100-299 pg/mL - Intermediate probability of acute heart  .               failure exacerbation. Correlate with clinical  .               context and patient history.    >=300 pg/mL - Heart Failure likely. Correlate with clinical  .               context and patient history.  BNP testing is performed using different testing   methodology at [facility] than at other   [facility]. Direct result comparisons should   only be made within the same method.       HGBA1C   Date/Time Value  "Ref Range Status   08/06/2020 01:38 AM 5.4 % Final     Comment:          Diagnosis of Diabetes-Adults   Non-Diabetic: < or = 5.6%   Increased risk for developing diabetes: 5.7-6.4%   Diagnostic of diabetes: > or = 6.5%  .       Monitoring of Diabetes                Age (y)     Therapeutic Goal (%)   Adults:          >18           <7.0   Pediatrics:    13-18           <7.5                   7-12           <8.0                   0- 6            7.5-8.5   American Diabetes Association. Diabetes Care 33(S1), Jan 2010.   Hemoglobin variant detected which does not interfere    with determination of Hemoglobin A1c. Hemoglobin   identification can be ordered to characterize the variant   if clinically indicated.       VLDL   Date/Time Value Ref Range Status   03/26/2022 08:59 AM 19 0 - 40 mg/dL Final   08/06/2020 01:38 AM 25 0 - 40 mg/dL Final      Last I/O:  I/O last 3 completed shifts:  In: 400 (3.5 mL/kg) [IV Piggyback:400]  Out: - (0 mL/kg)   Weight: 113.4 kg     Past Cardiology Tests (Last 3 Years):  EKG:  ECG 12 lead 02/09/2024    Echo:  No results found for this or any previous visit from the past 1095 days.    Ejection Fractions:  No results found for: \"EF\"  Cath:  No results found for this or any previous visit from the past 1095 days.    Stress Test:  No results found for this or any previous visit from the past 1095 days.    Cardiac Imaging:  No results found for this or any previous visit from the past 1095 days.      Inpatient Medications:  Scheduled medications   Medication Dose Route Frequency    apixaban  5 mg oral BID    cholecalciferol  2,000 Units oral Daily    diclofenac sodium  4 g Topical BID    folic acid  1 mg oral Daily    furosemide  20 mg oral Daily    morphine  4 mg intravenous Once    multivitamin  1 tablet oral Daily    ondansetron  4 mg intravenous Once    pantoprazole  40 mg oral Daily    piperacillin-tazobactam  3.375 g intravenous q6h     PRN medications   Medication    acetaminophen    Or "    acetaminophen    Or    acetaminophen    ammonium lactate    melatonin    polyethylene glycol    sodium chloride    traMADol     Continuous Medications   Medication Dose Last Rate       Physical Exam:  General: Patient is in no acute distress.  HEENT: atraumatic normocephalic.  Neck: is supple jugular venous pressure within normal limits no thyromegaly.  Cardiovascular regular rate and rhythm normal heart sounds no murmurs rubs or gallops.  Lungs: clear to auscultation bilaterally.  Abdomen: is soft nontender.  Extremities warm to touch no edema.  Right lower extremity is covered with Ace wrap.  1+ edema.  Left lower extremity within normal limits.     Assessment/Plan   1 chest pain.  Patient with history of mitral valve prolapse atypical chest pain currently has no chest pain.  Recommend monitoring and outpatient follow-up.     2.  Right lower extremity DVT and cellulitis.  Patient has popliteal DVT developed epistaxis with the higher dose Eliquis however she reminds me she reports to me that usually she develops epistaxis with antibiotics.  At this point my recommendation is to restart Eliquis since she has acute DVT.  Treated with antibiotics will reassess in a.m.     3.  Hypotension.  Patient blood pressure drops with beta-blockers do not see any indication for beta-blockers for the time being we will hold will monitor heart rate and blood pressure.  Peripheral IV 02/09/24 18 G Right Antecubital (Active)   Site Assessment Clean;Dry;Intact 02/13/24 0400   Dressing Type Transparent 02/13/24 0400   Line Status No blood return;Flushed;Saline locked;Capped 02/13/24 0400   Dressing Status Clean;Dry 02/13/24 0400   Number of days: 4       Code Status:  Full Code          Andre Cardenas MD

## 2024-02-13 NOTE — DISCHARGE SUMMARY
Discharge Diagnosis  Cellulitis of right lower leg    Issues Requiring Follow-Up  Venous stasis ulcers and cellulitis of right lower extremity.  DVT.  Discharge Meds     Your medication list        START taking these medications        Instructions Last Dose Given Next Dose Due   apixaban 5 mg tablet  Commonly known as: Eliquis      Take 1 tablet (5 mg) by mouth 2 times a day.       cephalexin 500 mg capsule  Commonly known as: Keflex      Take 1 capsule (500 mg) by mouth 3 times a day.       clotrimazole 1 % vaginal cream  Commonly known as: Lotrimin      Insert 1 applicator into the vagina once daily in the evening. Do this once a day while you are taking the oral antibiotics       pantoprazole 40 mg EC tablet  Commonly known as: ProtoNix  Start taking on: February 14, 2024      Take 1 tablet (40 mg) by mouth once daily. Do not crush, chew, or split. Do not start before February 14, 2024.       sodium chloride 0.65 % nasal spray  Commonly known as: Sabattus      Administer 1 spray into each nostril 4 times a day as needed for congestion.       traMADol 50 mg tablet  Commonly known as: Ultram      Take 1 tablet (50 mg) by mouth every 8 hours if needed for moderate pain (4 - 6).              CONTINUE taking these medications        Instructions Last Dose Given Next Dose Due   acetaminophen 500 mg tablet  Commonly known as: Tylenol           biotin 1 mg capsule           furosemide 20 mg tablet  Commonly known as: Lasix           Vitamin D3 50 MCG (2000 UT) tablet  Generic drug: cholecalciferol           Voltaren 1 % gel  Generic drug: diclofenac sodium                  STOP taking these medications      ALLEGRA ORAL        ammonium lactate 12 % lotion  Commonly known as: Lac-Hydrin        betamethasone valerate 0.1 % ointment  Commonly known as: Valisone        Cetaphil cream        clobetasoL 0.05 % lotion in metered-dose pump        clobetasol 0.05 % topical foam  Commonly known as: Olux        dutasteride 0.5 mg  capsule  Commonly known as: Avodart        fluocinonide 0.05 % cream  Commonly known as: Lidex        folic acid 1 mg tablet  Commonly known as: Folvite        metoprolol tartrate 25 mg tablet  Commonly known as: Lopressor        multivitamin tablet        ondansetron ODT 4 mg disintegrating tablet  Commonly known as: Zofran-ODT                  Where to Get Your Medications        These medications were sent to [facility]/pharmacy #[id number] - [place], [place] - [address] [facility]  [address] [facility] [place] [address]      Hours: 24-hours Phone: [phone]   apixaban 5 mg tablet  cephalexin 500 mg capsule  clotrimazole 1 % vaginal cream  pantoprazole 40 mg EC tablet  sodium chloride 0.65 % nasal spray  traMADol 50 mg tablet         Test Results Pending At Discharge  Pending Labs       Order Current Status    Blood Culture Preliminary result    Blood Culture Preliminary result            Hospital Course  This is a 53-year-old woman with history of DVT after COVID infection.  She was treated short-term and then had further imaging that showed chronic stable DVT.  She comes in now with right lower extremity edema and pain.  DVT ultrasound showed acute right popliteal DVT.  This is in a different location than her prior DVTs and her current scan was paired to her prior scans.  She was started on Eliquis.  She was started on antibiotics for cellulitis of her right lower extremity.  She was evaluated by infectious disease and Dr. Lakhani.  Dr. Lakhani recommending total 14 days of oral antibiotics with Keflex.  Ibis mcgill saw the patient from vascular surgery.  She recommends indefinite Eliquis at this point given recurrent DVT.  She will follow-up with Ibis mcgill for vascular needs and wound care.    He was also having atypical chest pain in the emergency room.  She was seen by Dr. Zambrano who recommends outpatient follow-up for possible stress test.    At this point time she is being discharged home.  Home care being arranged for  daily wound care visits until she gets an Unna boot placed in the wound care clinic next week.  She is to finish a course of Keflex for leg cellulitis.  She is going home on Eliquis 5 mg twice a day for DVT.  We are avoiding the starting loading dose because of repeated nosebleeds.  Follow-up in wound care clinic.  I spent 40 minutes arranging coordinating clarifying this discharge    Pertinent Physical Exam At Time of Discharge  Physical Exam    Outpatient Follow-Up  No future appointments.      Tonny Ross MD

## 2024-02-13 NOTE — HH CARE COORDINATION
Home Care received a Referral for Nursing. We have processed the referral for a Start of Care on 24-48 HOURS .     If you have any questions or concerns, please feel free to contact us at [phone]. Follow the prompts, enter your five digit zip code, and you will be directed to your care team on [facility].

## 2024-02-13 NOTE — NURSING NOTE
"Patient declined noon dose of Zosyn. Patient complaining of vaginal itching with no discharge. Patient states, \"I'm itching all over. Does it cause you to get dry skin?\" Assess patient and no rash or hives noted. Notify physician.  "

## 2024-02-13 NOTE — PROGRESS NOTES
Amy John is a 53 y.o. female on day 4 of admission presenting with Cellulitis of right lower leg.    Subjective   Interval History:   Patient seen and examined  Mild right leg pain  Afebrile, no chills  No chest pain or shortness of breath  No nausea vomiting diarrhea  Review of Systems   All other systems reviewed and are negative.      Objective   Range of Vitals (last 24 hours)  Heart Rate:  [70-79]   Temp:  [36.7 °C (98.1 °F)-36.9 °C (98.4 °F)]   Resp:  [17-18]   BP: (111-122)/(43-75)   SpO2:  [95 %-97 %]   Daily Weight  02/11/24 : 113 kg (250 lb)    Body mass index is 42.91 kg/m².    Physical Exam  Constitutional:       Appearance: Normal appearance.   HENT:      Head: Normocephalic and atraumatic.      Nose: Nose normal.   Eyes:      Extraocular Movements: Extraocular movements intact.      Conjunctiva/sclera: Conjunctivae normal.   Cardiovascular:      Rate and Rhythm: Regular rhythm.      Heart sounds: Normal heart sounds.   Pulmonary:      Breath sounds: Normal breath sounds.   Abdominal:      General: Bowel sounds are normal.      Palpations: Abdomen is soft.   Musculoskeletal:      Cervical back: Neck supple.      Right lower leg: Edema present.   Skin:     Comments: Right lower extremity erythema, anterior and posterior leg ulcers   Neurological:      Mental Status: She is alert and oriented to person, place, and time.   Psychiatric:         Mood and Affect: Mood normal.         Behavior: Behavior normal.     Antibiotics  famotidine PF (Pepcid) injection 20 mg  sodium chloride 0.9 % bolus 1,000 mL  acetaminophen (Tylenol) tablet 650 mg  morphine injection 4 mg  ondansetron (Zofran) injection 4 mg  piperacillin-tazobactam-dextrose (Zosyn) IV 3.375 g  vancomycin (Xellia) 1 g in 200 mL (Xellia) IVPB 1,000 mg  apixaban (Eliquis) tablet 10 mg  apixaban (Eliquis) tablet 5 mg        cholecalciferol (Vitamin D-3) tablet  acetaminophen (Tylenol) tablet    folic acid (Folvite) tablet        dutasteride  (Avodart) capsule      naltrexone (Depade) tablet    ondansetron (Zofran-ODT) disintegrating tablet  furosemide (Lasix) tablet  metoprolol tartrate (Lopressor) tablet  ammonium lactate (Lac-Hydrin) 12 % lotion 1 Application  fluocinonide (Lidex) 0.05 % ointment  cholecalciferol (Vitamin D-3) tablet 2,000 Units  clobetasol (Temovate) 0.05 % cream  diclofenac sodium (Voltaren) 1 % gel 4 g  fluocinonide (Lidex) 0.05 % cream 1 Application  folic acid (Folvite) tablet 1 mg  furosemide (Lasix) tablet 20 mg  metoprolol tartrate (Lopressor) tablet 12.5 mg  multivitamin 1 tablet  piperacillin-tazobactam-dextrose (Zosyn) IV 3.375 g  apixaban (Eliquis) tablet 10 mg  apixaban (Eliquis) tablet 5 mg  acetaminophen (Tylenol) tablet 650 mg  acetaminophen (Tylenol) oral liquid 650 mg  acetaminophen (Tylenol) suppository 650 mg  melatonin tablet 5 mg  polyethylene glycol (Glycolax, Miralax) packet 17 g  apixaban (Eliquis) tablet 10 mg  apixaban (Eliquis) tablet 5 mg  vancomycin-diluent combo no.1 (Xellia) IVPB 750 mg  traMADol (Ultram) tablet 50 mg  pantoprazole (ProtoNix) EC tablet 40 mg  apixaban (Eliquis) tablet 5 mg  sodium chloride (Ocean) 0.65 % nasal spray 1 spray  dicyclomine (Bentyl) capsule 10 mg      Relevant Results  Labs  Results from last 72 hours   Lab Units 02/13/24  0612   WBC AUTO x10*3/uL 6.5   HEMOGLOBIN g/dL 13.8   HEMATOCRIT % 43.0   PLATELETS AUTO x10*3/uL 217     Results from last 72 hours   Lab Units 02/13/24  0612   SODIUM mmol/L 141   POTASSIUM mmol/L 3.9   CHLORIDE mmol/L 106   CO2 mmol/L 24   BUN mg/dL 14   CREATININE mg/dL 1.20   GLUCOSE mg/dL 100*   CALCIUM mg/dL 9.5   ANION GAP mmol/L 11   EGFR mL/min/1.73m*2 54*         Estimated Creatinine Clearance: 65.2 mL/min (by C-G formula based on SCr of 1.2 mg/dL).  C-Reactive Protein   Date Value Ref Range Status   02/09/2024 1.20 0.00 - 2.00 mg/dL Final     Microbiology  Reviewed  Imaging  ECG 12 lead    Result Date: 2/12/2024  Normal sinus rhythm Right  atrial enlargement Left anterior fascicular block Poor R wave progression V1-V6 early repolarization I AVL Abnormal ECG When compared with ECG of 24-MAR-2022 09:23, No significant change was found Confirmed by Octavio Draper ([id number]) on 2/12/2024 9:20:57 PM    Lower extremity venous duplex right    Result Date: 2/9/2024  STUDY: Right Lower Extremity Venous Doppler Ultrasound; 2/9/2024 9:19 PM INDICATION: Right leg pain, edema and warmth x three months.  History of DVT. COMPARISON: None available. ACCESSION NUMBER(S): [id number] ORDERING CLINICIAN: KATI ALBARADO TECHNIQUE:  Real-time grayscale, color, and spectral doppler ultrasound imaging of the right lower extremity veins was performed. FINDINGS: There is acute nonocclusive DVT demonstrated within the right popliteal vein. The right common femoral, profunda femoral and femoral veins demonstrated normal compressibility, normal phasic venous flow and normal response to augmentation.  There is no evidence for echogenic thrombi.  The visualized deep calf veins are patent.  The contralateral common femoral vein is free of thrombosis.    Evidence for acute occlusive DVT within the right popliteal vein. The critical results of the study were discussed with, and acknowledged by Dr. Yrn Vo, by telephone on 02/09/2024 at 21:45. Signed by Richard Hudson II, MD    XR tibia fibula right 2 views    Result Date: 2/9/2024  Interpreted By:  Handy Oviedo, STUDY: XR TIBIA FIBULA RIGHT 2 VIEWS; 2/9/2024 7:21 pm   INDICATION: Signs/Symptoms:atraumatic pain;   COMPARISON: None available.   ACCESSION NUMBER(S): [id number]   ORDERING CLINICIAN: KATI ALBARADO   TECHNIQUE: Views: Right tibia and fibula, AP and Lateral   FINDINGS: RESULT: There is no evidence for fracture or dislocation. Joint spaces appear adequately maintained. No other bony or soft tissue abnormality is identified.       No evidence for acute osseous abnormality.   Signed by: Handy Oviedo 2/9/2024 7:42  PM Dictation workstation:   [id number]    XR chest 1 view    Result Date: 2/9/2024  Interpreted By:  Handy Oviedo, STUDY: XR CHEST 1 VIEW; 2/9/2024 7:21 pm   INDICATION: Signs/Symptoms:chest pain.   COMPARISON: 12/20/2021   ACCESSION NUMBER(S): [id number]   ORDERING CLINICIAN: KATI ALBARADO   TECHNIQUE: 1 view of the chest was performed.   FINDINGS: The lungs are adequately inflated. No acute consolidation. No pleural effusion. No pneumothorax.  The cardiomediastinal silhouette is within normal limits.       No acute cardiopulmonary disease.   Signed by: Handy Oviedo 2/9/2024 7:37 PM Dictation workstation:   [id number]    XR ribs left 2 views    Result Date: 1/28/2024  * * *Final Report* * * DATE OF EXAM: Jan 28 2024  1:00PM   EUX   5243  -  XR RIB/CHST 3V AP RIB/OBL/CHST L  / ACCESSION #  [id number] PROCEDURE REASON: Trauma      * * * * Physician Interpretation * * * * RESULT: HISTORY:  FELL PAST MONDAY HITTING SIDE ON STOOL TECHNIQUE: XR RIB/CHST 3V AP RIB/OBL/CHST L    Laterality:  LEFT    Number of different views (projections): 1 CHEST 2 RIBS COMPARISON: Chest radiograph 06/12/2022 RESULT: No acute displaced rib fracture. The imaged lung fields are clear with no focal infiltrate.  No pleural effusion or pneumothorax.  Stable cardiomediastinal silhouette. ---------------------------------------------    IMPRESSION: No acute displaced rib fracture. Transcribed Using Voice Recognition Transcribe Date/Time: Jan 28 2024  1:24P Dictated by: MIGUELITO LYNCH MD This examination was interpreted and the report reviewed and electronically signed by: MIGUELITO LYNCH MD on Jan 28 2024  1:27PM  EST    CT abdomen pelvis wo IV contrast    Result Date: 1/28/2024  * * *Final Report* * * DATE OF EXAM: Jan 28 2024 12:58PM   EUC   0531  -  CT ABD/PEL WO IVCON  / ACCESSION #  [id number] PROCEDURE REASON: Abdominal trauma, blunt      * * * * Physician Interpretation * * * * RESULT: EXAMINATION:  CT ABDOMEN AND PELVIS  WITHOUT IV CONTRAST CLINICAL HISTORY: Fall.  Left-sided abdominal pain. TECHNIQUE: Non-IV contrast imaging of the abdomen and pelvis was performed using standard technique, scanning from just above the dome of the diaphragm to the symphysis pubis.  Unenhanced imaging is limited for the evaluation of some intra-abdominal and pelvic pathology. MQ:  CTAPWO_3 Contrast: IV: None Oral:  None CT Radiation dose: Integrated Dose-length product (DLP) for this visit =   386 mGy*cm. CT Dose Reduction Employed: Automated exposure control(AEC) and iterative recon COMPARISON: CT abdomen pelvis 12/29/2022 RESULT: Abdomen / Pelvis: Liver: Hepatic steatosis. Biliary: The gallbladder is unremarkable. Spleen: No splenomegaly. Pancreas: Unremarkable. Adrenals: No mass. Kidneys: No hydronephrosis or nephrolithiasis.  2.4 cm right lower pole cyst. GI Tract: No bowel dilation. Lymph Nodes: No lymphadenopathy. Mesentery/peritoneum: No ascites. Retroperitoneum: No mass. Vasculature: No abdominal aortic or iliac artery aneurysm. Pelvis: No mass or ascites. Bones/Soft Tissues: No acute osseous abnormality. Lower thorax: Stable 2 mm peripheral left lower lobe nodule (2:6).  (topogram) images: No additional findings.    IMPRESSION: No acute abnormality in the abdomen or pelvis. Hepatic steatosis. Transcribed Using Voice Recognition Transcribe Date/Time: Jan 28 2024  1:10P Dictated by: MIGUELITO LYNCH MD This examination was interpreted and the report reviewed and electronically signed by: MIGUELITO LYNCH MD on Jan 28 2024  1:21PM  EST     Assessment/Plan   right leg cellulitis-slowly resolving  Right leg venous ulcers  Right lower extremity DVT        Continue Zosyn  Right leg wound culture  Anticoagulation  Local care   monitor temperature and WBC  Vascular surgery follow-up  Long-term plan is to de-escalate to Augmentin to complete total of 14 days      Omega Lakhani MD

## 2024-02-14 LAB
BACTERIA BLD CULT: NORMAL
BACTERIA BLD CULT: NORMAL

## 2024-12-09 ENCOUNTER — HOSPITAL ENCOUNTER (EMERGENCY)
Facility: HOSPITAL | Age: 54
Discharge: HOME | End: 2024-12-09
Attending: EMERGENCY MEDICINE
Payer: OTHER GOVERNMENT

## 2024-12-09 VITALS
WEIGHT: 252.43 LBS | OXYGEN SATURATION: 99 % | HEIGHT: 64 IN | TEMPERATURE: 97.7 F | DIASTOLIC BLOOD PRESSURE: 83 MMHG | RESPIRATION RATE: 16 BRPM | SYSTOLIC BLOOD PRESSURE: 146 MMHG | BODY MASS INDEX: 43.1 KG/M2 | HEART RATE: 83 BPM

## 2024-12-09 DIAGNOSIS — I10 DIASTOLIC HYPERTENSION: ICD-10-CM

## 2024-12-09 DIAGNOSIS — L03.115 CELLULITIS OF RIGHT LOWER EXTREMITY: Primary | ICD-10-CM

## 2024-12-09 PROCEDURE — 99283 EMERGENCY DEPT VISIT LOW MDM: CPT | Performed by: EMERGENCY MEDICINE

## 2024-12-09 PROCEDURE — 2500000001 HC RX 250 WO HCPCS SELF ADMINISTERED DRUGS (ALT 637 FOR MEDICARE OP): Performed by: EMERGENCY MEDICINE

## 2024-12-09 RX ORDER — DOXYCYCLINE 100 MG/1
100 TABLET ORAL 2 TIMES DAILY
Qty: 20 TABLET | Refills: 0 | Status: SHIPPED | OUTPATIENT
Start: 2024-12-09 | End: 2024-12-19

## 2024-12-09 RX ORDER — ACETAMINOPHEN 325 MG/1
975 TABLET ORAL ONCE
Status: COMPLETED | OUTPATIENT
Start: 2024-12-09 | End: 2024-12-09

## 2024-12-09 RX ORDER — IBUPROFEN 400 MG/1
400 TABLET ORAL ONCE
Status: DISCONTINUED | OUTPATIENT
Start: 2024-12-09 | End: 2024-12-09 | Stop reason: HOSPADM

## 2024-12-09 RX ADMIN — ACETAMINOPHEN 975 MG: 325 TABLET ORAL at 06:14

## 2024-12-09 ASSESSMENT — PAIN DESCRIPTION - ONSET: ONSET: GRADUAL

## 2024-12-09 ASSESSMENT — PAIN DESCRIPTION - PROGRESSION: CLINICAL_PROGRESSION: GRADUALLY WORSENING

## 2024-12-09 ASSESSMENT — PAIN SCALES - GENERAL: PAINLEVEL_OUTOF10: 7

## 2024-12-09 ASSESSMENT — COLUMBIA-SUICIDE SEVERITY RATING SCALE - C-SSRS
6. HAVE YOU EVER DONE ANYTHING, STARTED TO DO ANYTHING, OR PREPARED TO DO ANYTHING TO END YOUR LIFE?: NO
2. HAVE YOU ACTUALLY HAD ANY THOUGHTS OF KILLING YOURSELF?: NO
1. IN THE PAST MONTH, HAVE YOU WISHED YOU WERE DEAD OR WISHED YOU COULD GO TO SLEEP AND NOT WAKE UP?: NO

## 2024-12-09 ASSESSMENT — PAIN DESCRIPTION - PAIN TYPE: TYPE: ACUTE PAIN

## 2024-12-09 ASSESSMENT — PAIN DESCRIPTION - ORIENTATION: ORIENTATION: RIGHT

## 2024-12-09 ASSESSMENT — PAIN - FUNCTIONAL ASSESSMENT: PAIN_FUNCTIONAL_ASSESSMENT: 0-10

## 2024-12-09 ASSESSMENT — PAIN DESCRIPTION - FREQUENCY: FREQUENCY: CONSTANT/CONTINUOUS

## 2024-12-09 ASSESSMENT — PAIN DESCRIPTION - LOCATION: LOCATION: LEG

## 2024-12-09 NOTE — ED PROVIDER NOTES
"HPI   Chief Complaint   Patient presents with    Leg Pain     Pt had biopsy of \"sores\" on her right leg Friday.  She states it is oozing and that her leg is swollen and painful.       HPI        Patient History   Past Medical History:   Diagnosis Date    Acute deep vein thrombosis (DVT) of right iliofemoral vein (Multi)     BPPV (benign paroxysmal positional vertigo)     Cardiac murmur     Mitral valve prolapse     Sickle cell trait (CMS-HCC)     Venous insufficiency      Past Surgical History:   Procedure Laterality Date    OTHER SURGICAL HISTORY  07/28/2022    Tubal ligation    OTHER SURGICAL HISTORY  07/28/2022    Tubal ligation reversal     Family History   Problem Relation Name Age of Onset    Cancer Mother      Heart disease Mother      Diabetes Father      Hypertension Father      Dementia Father       Social History     Tobacco Use    Smoking status: Never    Smokeless tobacco: Never   Substance Use Topics    Alcohol use: Not Currently    Drug use: Never       Physical Exam   ED Triage Vitals [12/09/24 0544]   Temperature Heart Rate Respirations BP   36.5 °C (97.7 °F) 83 16 146/83      Pulse Ox Temp Source Heart Rate Source Patient Position   99 % Temporal Monitor --      BP Location FiO2 (%)     -- --       Physical Exam      ED Course & MDM   Diagnoses as of 12/09/24 0645   Cellulitis of right lower extremity   Diastolic hypertension                 No data recorded     [place] Coma Scale Score: 15 (12/09/24 0546 : Robin Gutiérrez RN)                           Medical Decision Making    The patient is a 53-year-old female presenting to the emergency department for evaluation of redness, swelling, warmth and pain of her right lower extremity.  The patient states that she had a biopsy of some lesions on her lower leg [facility] ago and started having some pain and swelling with redness around the lesions 2 days ago.  She is concerned that she could have cellulitis.  She does have chronic " lymphedema with chronic bilateral lower extremity edema and erythema.  She has had cellulitis in the past with similar symptoms.  She denies any other injury or trauma.  No headache or visual changes.  No chest pain or shortness of breath.  No abdominal pain.  No nausea vomiting.  No diarrhea or constipation.  no urinary complaints.  No focal weakness or numbness.  All pertinent positives and negatives are recorded above.  All other systems reviewed and otherwise negative.  Vital signs with mild diastolic hypertension but otherwise within normal limits.  Physical exam with a well-nourished well-developed female in no acute distress.  HEENT exam within normal limits.  She has no evidence of airway compromise or respiratory distress.  Abdominal exam is benign.  She does not have any gross motor, neurologic or vascular deficits on exam.  She has some mild warmth and erythema of both of her lower extremities.  She does have bandages covering the lesions where she had the biopsies performed.  There is no purulence of the wounds.  She does have equal pulses bilaterally.  She is well-perfused on exam.  No visible or palpable bony deformity.      Oral ibuprofen and oral acetaminophen ordered.      No indication for emergent imaging and/or diagnostic testing at this time.      The patient was released in good condition.  She was instructed to follow-up with her primary care physician within 1 to 2 days for further management of her current symptoms, repeat check of her blood pressure and a wound check.  She will return to the emergency department sooner with worsening of symptoms or onset of new symptoms.  Rx given for doxycycline.      Impression/diagnosis  Cellulitis, right lower extremity  Diastolic hypertension      Critical care time billing is not warranted at this time    Procedure  Procedures     Sparkle Elias MD  12/09/24 0613       Sparkle Elias MD  12/09/24 0645

## 2024-12-09 NOTE — DISCHARGE INSTRUCTIONS
Follow-up with your primary care physician within 1 to 2 days for further management of your current symptoms and repeat check of your blood pressure and a wound check.    Return to the emergency department sooner with worsening of symptoms or onset of new symptoms

## 2024-12-09 NOTE — Clinical Note
Amy John was seen and treated in our emergency department on 12/9/2024.  She may return to work on 12/09/2024.       If you have any questions or concerns, please don't hesitate to call.      Sparkle Elias MD

## 2025-07-18 ENCOUNTER — HOSPITAL ENCOUNTER (EMERGENCY)
Facility: HOSPITAL | Age: 55
Discharge: HOME | End: 2025-07-18
Payer: COMMERCIAL

## 2025-07-18 ENCOUNTER — APPOINTMENT (OUTPATIENT)
Dept: RADIOLOGY | Facility: HOSPITAL | Age: 55
End: 2025-07-18
Payer: COMMERCIAL

## 2025-07-18 VITALS
HEIGHT: 64 IN | TEMPERATURE: 98.1 F | WEIGHT: 245 LBS | SYSTOLIC BLOOD PRESSURE: 133 MMHG | RESPIRATION RATE: 16 BRPM | HEART RATE: 73 BPM | OXYGEN SATURATION: 97 % | BODY MASS INDEX: 41.83 KG/M2 | DIASTOLIC BLOOD PRESSURE: 65 MMHG

## 2025-07-18 DIAGNOSIS — R10.84 GENERALIZED ABDOMINAL PAIN: Primary | ICD-10-CM

## 2025-07-18 LAB
ALBUMIN SERPL BCP-MCNC: 4.2 G/DL (ref 3.4–5)
ALP SERPL-CCNC: 61 U/L (ref 33–110)
ALT SERPL W P-5'-P-CCNC: 27 U/L (ref 7–45)
ANION GAP SERPL CALCULATED.3IONS-SCNC: 10 MMOL/L (ref 10–20)
APPEARANCE UR: CLEAR
AST SERPL W P-5'-P-CCNC: 23 U/L (ref 9–39)
BASOPHILS # BLD AUTO: 0.02 X10*3/UL (ref 0–0.1)
BASOPHILS NFR BLD AUTO: 0.4 %
BILIRUB SERPL-MCNC: 0.5 MG/DL (ref 0–1.2)
BILIRUB UR STRIP.AUTO-MCNC: NEGATIVE MG/DL
BUN SERPL-MCNC: 10 MG/DL (ref 6–23)
CALCIUM SERPL-MCNC: 9.3 MG/DL (ref 8.6–10.3)
CHLORIDE SERPL-SCNC: 108 MMOL/L (ref 98–107)
CO2 SERPL-SCNC: 26 MMOL/L (ref 21–32)
COLOR UR: NORMAL
CREAT SERPL-MCNC: 0.91 MG/DL (ref 0.5–1.05)
EGFRCR SERPLBLD CKD-EPI 2021: 74 ML/MIN/1.73M*2
EOSINOPHIL # BLD AUTO: 0.09 X10*3/UL (ref 0–0.7)
EOSINOPHIL NFR BLD AUTO: 1.7 %
ERYTHROCYTE [DISTWIDTH] IN BLOOD BY AUTOMATED COUNT: 13 % (ref 11.5–14.5)
GLUCOSE SERPL-MCNC: 80 MG/DL (ref 74–99)
GLUCOSE UR STRIP.AUTO-MCNC: NORMAL MG/DL
HCT VFR BLD AUTO: 43.5 % (ref 36–46)
HGB BLD-MCNC: 14.2 G/DL (ref 12–16)
IMM GRANULOCYTES # BLD AUTO: 0.01 X10*3/UL (ref 0–0.7)
IMM GRANULOCYTES NFR BLD AUTO: 0.2 % (ref 0–0.9)
KETONES UR STRIP.AUTO-MCNC: NEGATIVE MG/DL
LEUKOCYTE ESTERASE UR QL STRIP.AUTO: NEGATIVE
LIPASE SERPL-CCNC: 16 U/L (ref 9–82)
LYMPHOCYTES # BLD AUTO: 1.79 X10*3/UL (ref 1.2–4.8)
LYMPHOCYTES NFR BLD AUTO: 32.8 %
MCH RBC QN AUTO: 29 PG (ref 26–34)
MCHC RBC AUTO-ENTMCNC: 32.6 G/DL (ref 32–36)
MCV RBC AUTO: 89 FL (ref 80–100)
MONOCYTES # BLD AUTO: 0.44 X10*3/UL (ref 0.1–1)
MONOCYTES NFR BLD AUTO: 8.1 %
NEUTROPHILS # BLD AUTO: 3.1 X10*3/UL (ref 1.2–7.7)
NEUTROPHILS NFR BLD AUTO: 56.8 %
NITRITE UR QL STRIP.AUTO: NEGATIVE
NRBC BLD-RTO: 0 /100 WBCS (ref 0–0)
PH UR STRIP.AUTO: 6 [PH]
PLATELET # BLD AUTO: 213 X10*3/UL (ref 150–450)
POTASSIUM SERPL-SCNC: 4.5 MMOL/L (ref 3.5–5.3)
PROT SERPL-MCNC: 7.5 G/DL (ref 6.4–8.2)
PROT UR STRIP.AUTO-MCNC: NEGATIVE MG/DL
RBC # BLD AUTO: 4.9 X10*6/UL (ref 4–5.2)
RBC # UR STRIP.AUTO: NEGATIVE MG/DL
SODIUM SERPL-SCNC: 139 MMOL/L (ref 136–145)
SP GR UR STRIP.AUTO: 1.02
UROBILINOGEN UR STRIP.AUTO-MCNC: NORMAL MG/DL
WBC # BLD AUTO: 5.5 X10*3/UL (ref 4.4–11.3)

## 2025-07-18 PROCEDURE — 96374 THER/PROPH/DIAG INJ IV PUSH: CPT

## 2025-07-18 PROCEDURE — 99284 EMERGENCY DEPT VISIT MOD MDM: CPT | Mod: 25

## 2025-07-18 PROCEDURE — 36415 COLL VENOUS BLD VENIPUNCTURE: CPT | Performed by: NURSE PRACTITIONER

## 2025-07-18 PROCEDURE — 2500000005 HC RX 250 GENERAL PHARMACY W/O HCPCS: Performed by: NURSE PRACTITIONER

## 2025-07-18 PROCEDURE — 74176 CT ABD & PELVIS W/O CONTRAST: CPT | Performed by: RADIOLOGY

## 2025-07-18 PROCEDURE — 74176 CT ABD & PELVIS W/O CONTRAST: CPT

## 2025-07-18 PROCEDURE — 80053 COMPREHEN METABOLIC PANEL: CPT | Performed by: NURSE PRACTITIONER

## 2025-07-18 PROCEDURE — 2500000004 HC RX 250 GENERAL PHARMACY W/ HCPCS (ALT 636 FOR OP/ED): Mod: JZ | Performed by: NURSE PRACTITIONER

## 2025-07-18 PROCEDURE — 2500000001 HC RX 250 WO HCPCS SELF ADMINISTERED DRUGS (ALT 637 FOR MEDICARE OP): Performed by: NURSE PRACTITIONER

## 2025-07-18 PROCEDURE — 83690 ASSAY OF LIPASE: CPT | Performed by: NURSE PRACTITIONER

## 2025-07-18 PROCEDURE — 81003 URINALYSIS AUTO W/O SCOPE: CPT | Performed by: NURSE PRACTITIONER

## 2025-07-18 PROCEDURE — 85025 COMPLETE CBC W/AUTO DIFF WBC: CPT | Performed by: NURSE PRACTITIONER

## 2025-07-18 RX ORDER — LIDOCAINE HYDROCHLORIDE 20 MG/ML
1.25 SOLUTION OROPHARYNGEAL ONCE
Status: COMPLETED | OUTPATIENT
Start: 2025-07-18 | End: 2025-07-18

## 2025-07-18 RX ORDER — ALUMINUM HYDROXIDE, MAGNESIUM HYDROXIDE, AND SIMETHICONE 1200; 120; 1200 MG/30ML; MG/30ML; MG/30ML
30 SUSPENSION ORAL ONCE
Status: COMPLETED | OUTPATIENT
Start: 2025-07-18 | End: 2025-07-18

## 2025-07-18 RX ORDER — FAMOTIDINE 10 MG/ML
20 INJECTION, SOLUTION INTRAVENOUS ONCE
Status: COMPLETED | OUTPATIENT
Start: 2025-07-18 | End: 2025-07-18

## 2025-07-18 RX ADMIN — LIDOCAINE HYDROCHLORIDE 1.25 ML: 20 SOLUTION ORAL at 15:11

## 2025-07-18 RX ADMIN — FAMOTIDINE 20 MG: 10 INJECTION, SOLUTION INTRAVENOUS at 15:11

## 2025-07-18 RX ADMIN — ALUMINUM HYDROXIDE, MAGNESIUM HYDROXIDE, AND DIMETHICONE 30 ML: 200; 20; 200 SUSPENSION ORAL at 15:11

## 2025-07-18 ASSESSMENT — PAIN - FUNCTIONAL ASSESSMENT: PAIN_FUNCTIONAL_ASSESSMENT: 0-10

## 2025-07-18 ASSESSMENT — PAIN SCALES - GENERAL: PAINLEVEL_OUTOF10: 8

## 2025-07-18 NOTE — ED PROVIDER NOTES
HPI   Chief Complaint   Patient presents with    Abdominal Pain     LUQ radiates to flank       HPI  See my MDM      Patient History   Medical History[1]  Surgical History[2]  Family History[3]  Social History[4]    Physical Exam   ED Triage Vitals [07/18/25 1434]   Temperature Heart Rate Respirations BP   36.7 °C (98.1 °F) 73 16 133/65      Pulse Ox Temp src Heart Rate Source Patient Position   97 % -- -- --      BP Location FiO2 (%)     -- --       Physical Exam  CONSTITUTIONAL: Vital signs reviewed as charted, well-developed and in no distress  Eyes: Extraocular muscles are intact. Pupils equal round and reactive to light. Conjunctiva are pink.    ENT: Mucous membranes are moist. Tongue in the midline. Pharynx was without erythema or exudates, uvula midline  LUNGS: Breath sounds equal and clear to auscultation. Good air exchange, no wheezes rales or retractions, pulse oximetry is charted.  HEART: Regular rate and rhythm without murmur thrill or rub, strong tones, auscultation is normal.  ABDOMEN: Soft and nontender without guarding rebound rigidity or mass. Bowel sounds are present and normal in all quadrants. There is no palpable masses or aneurysms identified. No hepatosplenomegaly, normal abdominal exam.  Neuro: The patient is awake, alert and oriented ×3. Moving all 4 extremities and answering questions appropriately.   MUSCULOSKELETAL: The calves are nontender to palpation. Full gross active range of motion.   PSYCH: Awake alert oriented, normal mood and affect.  Skin:  Dry, normal color, warm to the touch, no rash present.        ED Course & MDM   Diagnoses as of 07/18/25 1648   Generalized abdominal pain                 No data recorded     [place] Coma Scale Score: 15 (07/18/25 1434 : Laz Murray, EMT)                           Medical Decision Making  History obtained from: patient    Vital signs, nursing notes, current medications, past medical history, Surgical history, allergies, social history,  family History were reviewed.         HPI:  Patient 54-year-old female present emergency room today complaining of left upper abdominal pain rating back towards her flank.  States it comes and goes.  States been on and off for the last year or 2.  States sometimes gets worse with food and sometimes it does not.  States sometimes hurts when she lays down and sometimes does not.  States is not always there.  She states she has had ultrasounds and other things done and they were all normal.  She has never had an EGD done.  She is nontoxic and well-appearing denies dizziness, chest pain, shortness of breath or lower extremity edema      10 point ROS was reviewed and negative except Noted above in HPI.  DDX: as listed above          MDM Summary/considerations:  Labs Reviewed   COMPREHENSIVE METABOLIC PANEL - Abnormal       Result Value    Glucose 80      Sodium 139      Potassium 4.5      Chloride 108 (*)     Bicarbonate 26      Anion Gap 10      Urea Nitrogen 10      Creatinine 0.91      eGFR 74      Calcium 9.3      Albumin 4.2      Alkaline Phosphatase 61      Total Protein 7.5      AST 23      Bilirubin, Total 0.5      ALT 27     LIPASE - Normal    Lipase 16      Narrative:     Venipuncture immediately after or during the administration of Metamizole may lead to falsely low results. Testing should be performed immediately prior to Metamizole dosing.   URINALYSIS WITH REFLEX CULTURE AND MICROSCOPIC - Normal    Color, Urine Light-Yellow      Appearance, Urine Clear      Specific Gravity, Urine 1.016      pH, Urine 6.0      Protein, Urine NEGATIVE      Glucose, Urine Normal      Blood, Urine NEGATIVE      Ketones, Urine NEGATIVE      Bilirubin, Urine NEGATIVE      Urobilinogen, Urine Normal      Nitrite, Urine NEGATIVE      Leukocyte Esterase, Urine NEGATIVE     CBC WITH AUTO DIFFERENTIAL    WBC 5.5      nRBC 0.0      RBC 4.90      Hemoglobin 14.2      Hematocrit 43.5      MCV 89      MCH 29.0      MCHC 32.6      RDW  13.0      Platelets 213      Neutrophils % 56.8      Immature Granulocytes %, Automated 0.2      Lymphocytes % 32.8      Monocytes % 8.1      Eosinophils % 1.7      Basophils % 0.4      Neutrophils Absolute 3.10      Immature Granulocytes Absolute, Automated 0.01      Lymphocytes Absolute 1.79      Monocytes Absolute 0.44      Eosinophils Absolute 0.09      Basophils Absolute 0.02     URINALYSIS WITH REFLEX CULTURE AND MICROSCOPIC    Narrative:     The following orders were created for panel order Urinalysis with Reflex Culture and Microscopic.  Procedure                               Abnormality         Status                     ---------                               -----------         ------                     Urinalysis with Reflex C...[[id number]]  Normal              Final result               Extra Urine Gray Tube[[id number]]                            In process                   Please view results for these tests on the individual orders.   EXTRA URINE GRAY TUBE     CT abdomen pelvis wo IV contrast   Final Result   1.  No acute findings.   2. Small fat containing umbilical hernia.   3. Hepatic steatosis.             MACRO:   None        Signed by: Mihir Mcneill 7/18/2025 4:41 PM   Dictation workstation:   [id number]        Medications   famotidine PF (Pepcid) injection 20 mg (20 mg intravenous Given 7/18/25 1511)   alum-mag hydroxide-simeth (Mylanta) 200-200-20 mg/5 mL oral suspension 30 mL (30 mL oral Given 7/18/25 1511)   lidocaine (Xylocaine) 2 % mouth solution 1.25 mL (1.25 mL Mouth/Throat Given 7/18/25 1511)     New Prescriptions    No medications on file     I estimate there is a low risk for acute appendicitis, bowel extraction, cystitis, diverticulitis, incarcerated hernia, mesenteric ischemia, pancreatitis, or perforated bowel or ulcer, thus I considered the discharge disposition reasonable. There is no evidence of peritonitis, sepsis, or toxicity. I have discussed the diagnosis and risks, and  we agreed with discharging home to follow-up with the primary care doctor. We also discussed returning to the emergency department immediately if new or worsening symptoms occur. Symptoms of most concern that we discussed are bloody stool, fever, changing or worsening pain, or vomiting that necessitates immediate return.      CT scan shows fatty liver and small fat-containing umbilical hernia but otherwise no gross abnormality.  Laboratory assessment is grossly unremarkable.  Patient was discharged home in stable condition was given GI referral.    All of the patient's questions were answered to the best of my ability.  Patient states understanding that they have been screened for an emergency today and we have not found any etiology of symptoms that requires emergent treatment or admission to the hospital at this point. They understand that they have not had definitive care day and require follow-up for treatment of their condition. They also state understanding that they may have an emergent condition that may potentially have not of detected at this visit and they must return to the emergency department if they develop any worsening of symptoms or new complaints.      I have evaluated this patient, my supervising physician was available for consultation.              Critical Care: Not warranted at this time        This chart was completed using voice recognition transcription software. Please excuse any errors of transcription including grammatical, punctuation, syntax and spelling errors.  Please contact me with any questions regarding this chart.    Procedure  Procedures       [1]   Past Medical History:  Diagnosis Date    Acute deep vein thrombosis (DVT) of right iliofemoral vein (Multi)     BPPV (benign paroxysmal positional vertigo)     Cardiac murmur     Mitral valve prolapse     Sickle cell trait     Venous insufficiency    [2]   Past Surgical History:  Procedure Laterality Date    OTHER SURGICAL HISTORY   07/28/2022    Tubal ligation    OTHER SURGICAL HISTORY  07/28/2022    Tubal ligation reversal   [3]   Family History  Problem Relation Name Age of Onset    Cancer Mother      Heart disease Mother      Diabetes Father      Hypertension Father      Dementia Father     [4]   Social History  Tobacco Use    Smoking status: Never    Smokeless tobacco: Never   Substance Use Topics    Alcohol use: Not Currently    Drug use: Never        REBA Fine-CNP  07/18/25 3990

## 2025-07-18 NOTE — DISCHARGE INSTRUCTIONS
Please return to the emergency room immediately if new or worsening symptoms occur. Symptoms which are concerning are bloody stool, fever, changing or worsening pain, or intractable vomiting. These necessitate immediate return to the emergency department.    Thank you for allowing us to take care of you today. While you are home, you might receive a survey about your care in our hospital. Your nurse and myself, Richi Morin CNP, would love your honest feedback on your care. Your feedback and especially your positive comments help our hospital receive the support we need to continue to serve you and your family. Thank you again for trusting us with your care.

## 2025-07-19 ENCOUNTER — HOSPITAL ENCOUNTER (EMERGENCY)
Facility: HOSPITAL | Age: 55
Discharge: HOME | End: 2025-07-19
Payer: COMMERCIAL

## 2025-07-19 VITALS
RESPIRATION RATE: 18 BRPM | TEMPERATURE: 98 F | HEART RATE: 82 BPM | BODY MASS INDEX: 41.83 KG/M2 | OXYGEN SATURATION: 98 % | SYSTOLIC BLOOD PRESSURE: 155 MMHG | DIASTOLIC BLOOD PRESSURE: 79 MMHG | WEIGHT: 245 LBS | HEIGHT: 64 IN

## 2025-07-19 DIAGNOSIS — R20.2 FACIAL PARESTHESIA: Primary | ICD-10-CM

## 2025-07-19 PROCEDURE — 99281 EMR DPT VST MAYX REQ PHY/QHP: CPT

## 2025-07-19 ASSESSMENT — LIFESTYLE VARIABLES
TOTAL SCORE: 0
HAVE YOU EVER FELT YOU SHOULD CUT DOWN ON YOUR DRINKING: NO
EVER HAD A DRINK FIRST THING IN THE MORNING TO STEADY YOUR NERVES TO GET RID OF A HANGOVER: NO
EVER FELT BAD OR GUILTY ABOUT YOUR DRINKING: NO
HAVE PEOPLE ANNOYED YOU BY CRITICIZING YOUR DRINKING: NO

## 2025-07-19 ASSESSMENT — PAIN SCALES - GENERAL: PAINLEVEL_OUTOF10: 0 - NO PAIN

## 2025-07-19 NOTE — ED TRIAGE NOTES
"Pt was seen yesterday, pt had an abd CT. Pt states  that while she was in the CT, she developed a sensation on the forehead. Pt states \"it feels like static electricity on the forehead\". No other complaints.   "

## 2025-07-20 NOTE — ED PROVIDER NOTES
HPI   Chief Complaint   Patient presents with    Facial Pain       54-year-old female presented emergency department with a chief complaint of a static electricity feeling across her forehead.  States it is worse on the right side but does encompass the left and right side of the forehead and spreads posteriorly to the mid scalp.  She has no other associated numbness or focal weakness.  She states this specifically started yesterday when she was in the emergency department when the CT scan went near her head.  She thinks it may be related to her wearing earrings and a CT scan.  She is otherwise never had this feeling before.  She denies any viral-like illness.  She denies fall or injury or trauma.              Patient History   Medical History[1]  Surgical History[2]  Family History[3]  Social History[4]    Physical Exam   ED Triage Vitals [07/19/25 1855]   Temperature Heart Rate Respirations BP   36.7 °C (98 °F) 82 18 155/79      Pulse Ox Temp Source Heart Rate Source Patient Position   98 % Oral Monitor --      BP Location FiO2 (%)     -- --       Physical Exam  Vitals and nursing note reviewed.   Constitutional:       Appearance: Normal appearance.   HENT:      Head: Normocephalic.      Nose: Nose normal.      Mouth/Throat:      Mouth: Mucous membranes are moist.     Cardiovascular:      Rate and Rhythm: Normal rate and regular rhythm.      Pulses: Normal pulses.      Heart sounds: Normal heart sounds.   Pulmonary:      Effort: Pulmonary effort is normal.      Breath sounds: Normal breath sounds.   Abdominal:      General: Abdomen is flat.     Musculoskeletal:         General: Normal range of motion.      Cervical back: Normal range of motion.     Skin:     General: Skin is warm.     Neurological:      General: No focal deficit present.      Mental Status: She is alert and oriented to person, place, and time.      Comments: The face has normal symmetrical movements.  Eyebrows lift symmetrically, nasal folds are  symmetric, there is no sensation deficit on exam, strength and sensation upper and lower extremities is intact, patient is ambulatory   Psychiatric:         Mood and Affect: Mood normal.         Behavior: Behavior normal.           ED Course & MDM   Diagnoses as of 07/19/25 2027   Facial paresthesia                 No data recorded     Dane Coma Scale Score: 15 (07/19/25 1901 : Myranda Zapata RN)                           Medical Decision Making  I have seen and evaluated this patient.  Physician available for consultation.  Vital signs have been reviewed.  All laboratory and diagnostic imaging is reviewed by myself and interpreted by myself unless otherwise stated.  Additionally imaging is interpreted by radiologist.    Patient had laboratory studies yesterday, these were reviewed.  There is no electrolyte abnormality.  No magnesium was checked.  There is no focal deficit or sensation deficit on exam.  There is no rash or lesion.  There is no evidence of Bell's palsy or herpes zoster at this time.  Cannot think of any correlation to the CT scan causing this.  Recommended repeat testing and CT scan of brain to ensure no dangerous etiology.  Patient is declining these recommendations and would like to follow-up as an outpatient.  Risk-benefit discussed.    Labs Reviewed - No data to display  No orders to display  Medications - No data to display  Discharge Medication List as of 7/19/2025  7:55 PM                Procedure  Procedures         [1]   Past Medical History:  Diagnosis Date    Acute deep vein thrombosis (DVT) of right iliofemoral vein (Multi)     BPPV (benign paroxysmal positional vertigo)     Cardiac murmur     Mitral valve prolapse     Sickle cell trait     Venous insufficiency    [2]   Past Surgical History:  Procedure Laterality Date    OTHER SURGICAL HISTORY  07/28/2022    Tubal ligation    OTHER SURGICAL HISTORY  07/28/2022    Tubal ligation reversal   [3]   Family History  Problem Relation Name  Age of Onset    Cancer Mother      Heart disease Mother      Diabetes Father      Hypertension Father      Dementia Father     [4]   Social History  Tobacco Use    Smoking status: Never    Smokeless tobacco: Never   Substance Use Topics    Alcohol use: Not Currently    Drug use: Never        Richard Horowitz PA-C  07/19/25 2035

## 2025-08-08 ENCOUNTER — HOSPITAL ENCOUNTER (OUTPATIENT)
Dept: RADIOLOGY | Facility: CLINIC | Age: 55
Discharge: HOME | End: 2025-08-08
Payer: COMMERCIAL

## 2025-08-08 DIAGNOSIS — Z86.718 PERSONAL HISTORY OF OTHER VENOUS THROMBOSIS AND EMBOLISM: ICD-10-CM

## 2025-08-08 DIAGNOSIS — R13.10 DYSPHAGIA, UNSPECIFIED: ICD-10-CM

## 2025-08-08 DIAGNOSIS — R29.90 UNSPECIFIED SYMPTOMS AND SIGNS INVOLVING THE NERVOUS SYSTEM: ICD-10-CM

## 2025-08-08 DIAGNOSIS — R20.2 PARESTHESIA OF SKIN: ICD-10-CM

## 2025-08-08 DIAGNOSIS — R20.0 ANESTHESIA OF SKIN: ICD-10-CM
